# Patient Record
Sex: MALE | Race: WHITE | Employment: OTHER | URBAN - METROPOLITAN AREA
[De-identification: names, ages, dates, MRNs, and addresses within clinical notes are randomized per-mention and may not be internally consistent; named-entity substitution may affect disease eponyms.]

---

## 2017-10-10 PROBLEM — E78.5 HYPERLIPIDEMIA, UNSPECIFIED HYPERLIPIDEMIA TYPE: Status: ACTIVE | Noted: 2017-10-10

## 2020-08-20 PROBLEM — I49.9 IRREGULAR HEARTBEAT: Status: ACTIVE | Noted: 2020-08-20

## 2020-08-20 PROBLEM — E04.9 THYROID ENLARGEMENT: Status: ACTIVE | Noted: 2020-08-20

## 2021-01-25 PROBLEM — I49.3 PVC (PREMATURE VENTRICULAR CONTRACTION): Status: ACTIVE | Noted: 2021-01-25

## 2021-08-23 PROBLEM — E04.9 THYROID ENLARGEMENT: Status: RESOLVED | Noted: 2020-08-20 | Resolved: 2021-08-23

## 2021-08-23 PROBLEM — I49.3 FREQUENT PVCS: Status: ACTIVE | Noted: 2021-01-25

## 2021-08-23 PROBLEM — I49.9 IRREGULAR HEARTBEAT: Status: RESOLVED | Noted: 2020-08-20 | Resolved: 2021-08-23

## 2021-11-29 PROBLEM — I49.9 IRREGULAR CARDIAC RHYTHM: Status: ACTIVE | Noted: 2020-08-20

## 2024-01-09 RX ORDER — ASPIRIN 325 MG
325 TABLET ORAL DAILY
COMMUNITY

## 2024-01-09 RX ORDER — LAMOTRIGINE 25 MG/1
75 TABLET ORAL EVERY MORNING
COMMUNITY
Start: 2023-11-17

## 2024-01-09 NOTE — PAT NURSING NOTE
Patient called for upcoming EUS with Dr. Cueva on 1/18. Per pt, he was taking aspirin 325 mg per neurology, but stopped it for a surgery in December. Pt has not gotten back on it since and was planning to hold it for the EUS procedure. Pt informed that he should reach out to his neurologist to let them know he has been holding his aspirin and not gotten back on it. Patient notified if he is told to resume the aspirin, to call Dr. Cueva's office for instructions on when to hold the aspirin. Dr. Cueva's office number provided. Patient verbalized understanding.    Ambulatory

## 2024-01-18 ENCOUNTER — HOSPITAL ENCOUNTER (OUTPATIENT)
Facility: HOSPITAL | Age: 74
Setting detail: HOSPITAL OUTPATIENT SURGERY
Discharge: HOME | End: 2024-01-18
Attending: INTERNAL MEDICINE | Admitting: INTERNAL MEDICINE
Payer: COMMERCIAL

## 2024-01-18 ENCOUNTER — ANESTHESIA (OUTPATIENT)
Dept: ENDOSCOPY | Facility: HOSPITAL | Age: 74
End: 2024-01-18
Payer: COMMERCIAL

## 2024-01-18 ENCOUNTER — ANESTHESIA EVENT (OUTPATIENT)
Dept: ENDOSCOPY | Facility: HOSPITAL | Age: 74
End: 2024-01-18
Payer: COMMERCIAL

## 2024-01-18 PROCEDURE — BF4CZZZ ULTRASONOGRAPHY OF HEPATOBILIARY SYSTEM, ALL: ICD-10-PCS | Performed by: INTERNAL MEDICINE

## 2024-01-18 PROCEDURE — 0DJ08ZZ INSPECTION OF UPPER INTESTINAL TRACT, VIA NATURAL OR ARTIFICIAL OPENING ENDOSCOPIC: ICD-10-PCS | Performed by: INTERNAL MEDICINE

## 2024-01-18 RX ORDER — SODIUM CHLORIDE, SODIUM LACTATE, POTASSIUM CHLORIDE, CALCIUM CHLORIDE 600; 310; 30; 20 MG/100ML; MG/100ML; MG/100ML; MG/100ML
INJECTION, SOLUTION INTRAVENOUS CONTINUOUS
Status: DISCONTINUED | OUTPATIENT
Start: 2024-01-18 | End: 2024-01-18

## 2024-01-18 RX ORDER — LEVOFLOXACIN 5 MG/ML
500 INJECTION, SOLUTION INTRAVENOUS ONCE
Status: DISCONTINUED | OUTPATIENT
Start: 2024-01-18 | End: 2024-01-18

## 2024-01-18 RX ORDER — LIDOCAINE HYDROCHLORIDE 10 MG/ML
INJECTION, SOLUTION EPIDURAL; INFILTRATION; INTRACAUDAL; PERINEURAL AS NEEDED
Status: DISCONTINUED | OUTPATIENT
Start: 2024-01-18 | End: 2024-01-18 | Stop reason: SURG

## 2024-01-18 RX ORDER — NALOXONE HYDROCHLORIDE 0.4 MG/ML
0.08 INJECTION, SOLUTION INTRAMUSCULAR; INTRAVENOUS; SUBCUTANEOUS ONCE AS NEEDED
Status: DISCONTINUED | OUTPATIENT
Start: 2024-01-18 | End: 2024-01-18

## 2024-01-18 RX ADMIN — LIDOCAINE HYDROCHLORIDE 50 MG: 10 INJECTION, SOLUTION EPIDURAL; INFILTRATION; INTRACAUDAL; PERINEURAL at 09:04:00

## 2024-01-18 NOTE — ANESTHESIA POSTPROCEDURE EVALUATION
Patient: Musa Chavez    Procedure Summary       Date: 01/18/24 Room / Location: [facility] ENDOSCOPY 01 / [facility] ENDOSCOPY    Anesthesia Start: 0901 Anesthesia Stop: 0922    Procedure: ENDOSCOPIC ULTRASOUND (UPPER) WITH FINE NEEDLE ASPIRATION Diagnosis:       Pancreas cyst      (Intraductal papillary mucinous neoplasm)    Surgeons: Yaya Cueva MD Anesthesiologist: Benjy Antonio MD    Anesthesia Type: MAC ASA Status: 2            Anesthesia Type: MAC    Vitals Value Taken Time   /96 01/18/24 0920   Temp 98 01/18/24 0922   Pulse 57 01/18/24 0921   Resp 15 01/18/24 0921   SpO2 98 % 01/18/24 0921   Vitals shown include unfiled device data.    [facility] AN Post Evaluation    Benjy Antonio MD  1/18/2024 9:22 AM

## 2024-01-18 NOTE — ANESTHESIA PREPROCEDURE EVALUATION
Anesthesia PreOp Note    HPI:     Musa Chavez is a 74 year old male who presents for preoperative consultation requested by: Yaya Cueva MD    Date of Surgery: 1/18/2024    Procedure(s):  ENDOSCOPIC ULTRASOUND (UPPER) WITH FINE NEEDLE ASPIRATION  Indication: Pancreas cyst    Relevant Problems   No relevant active problems       NPO:  Last Liquid Consumption Date: 01/18/24  Last Liquid Consumption Time: 0515  Last Solid Consumption Date: 01/17/24  Last Solid Consumption Time: 1930  Last Liquid Consumption Date: 01/18/24          History Review:  Patient Active Problem List    Diagnosis Date Noted    Frequent PVCs 01/25/2021    Irregular cardiac rhythm 08/20/2020    Hyperlipidemia, unspecified hyperlipidemia type 10/10/2017    Essential hypertension 05/17/2011       Past Medical History:   Diagnosis Date    Arrhythmia     \"missed beats\" per pt    High blood pressure     High cholesterol     Hyperlipidemia     HYPERTENSION     OBESITY     Renal disorder     Mass on kidney; getting worked up after EUS with Dr. Cueva 01/18    Seizure disorder (HCC)     Possible seizures, pt worked up by neurology in July 2023       Past Surgical History:   Procedure Laterality Date    COLONOSCOPY  2004 and 2014    OTHER SURGICAL HISTORY      testicle    TONSILLECTOMY         Medications Prior to Admission   Medication Sig Dispense Refill Last Dose    lamoTRIgine 25 MG Oral Tab Take 3 tablets (75 mg total) by mouth every morning.   1/18/2024 at 0515    aspirin 325 MG Oral Tab Take 1 tablet (325 mg total) by mouth daily.   12/25/2023    metoprolol tartrate 25 MG Oral Tab Take 1 tablet (25 mg total) by mouth 2 (two) times daily. 180 tablet 0 1/18/2024 at 0515    atorvastatin 10 MG Oral Tab Take 1 tablet (10 mg total) by mouth daily. 90 tablet 0 1/17/2024    losartan 100 MG Oral Tab Take 1 tablet (100 mg total) by mouth daily. 90 tablet 1 1/18/2024     Current Facility-Administered Medications Ordered in Epic   Medication Dose  Route Frequency Provider Last Rate Last Admin    lactated ringers infusion   Intravenous Continuous Yaya Cueva MD        levoFLOXacin in dextrose 5% (Levaquin) 500 mg/100mL IVPB premix 500 mg  500 mg Intravenous Once Yaya Cueva MD         No current [facility]-ordered outpatient medications on file.       No Known Allergies    Family History   Problem Relation Age of Onset    Cancer Father         bladder    Hypertension Mother     Lipids Mother      Social History     Socioeconomic History    Marital status:    Tobacco Use    Smoking status: Never    Smokeless tobacco: Never   Substance and Sexual Activity    Alcohol use: Yes     Comment: socially, once a week    Drug use: No       Available pre-op labs reviewed.             Vital Signs:  Body mass index is 32.98 kg/m².   height is 1.854 m (6' 1\") and weight is 113.4 kg (250 lb). His blood pressure is 167/100 (abnormal) and his pulse is 64. His respiration is 19 and oxygen saturation is 99%.   Vitals:    01/09/24 1044 01/18/24 0841   BP:  (!) 167/100   Pulse:  64   Resp:  19   SpO2:  99%   Weight: 113.4 kg (250 lb)    Height: 1.854 m (6' 1\")         Anesthesia Evaluation     Patient summary reviewed    Airway   Mallampati: III  TM distance: >3 FB  Neck ROM: full  Dental      Pulmonary - negative ROS and normal exam   Cardiovascular - normal exam  (+) hypertension    Neuro/Psych    (+)  seizures,        GI/Hepatic/Renal - negative ROS     Endo/Other - negative ROS   Abdominal  - normal exam                 Anesthesia Plan:   ASA:  2  Plan:   MAC  Plan Comments: Anesthesia plan was discussed with the patient, going through the rationale, expectations, benefits and risks namely injury to lips, teeth, gyms or corneas, risks of an allergic reaction, risk of awareness or recall of intra-operative events, risk of prolonged intubation and ICU admission, risks of kidney failure, risk of coronary events or dysrhythmias, risk of cerebrovascular events or  stroke and on rare occasions death.         I have informed Musa Chavez and/or legal guardian or family member of the nature of the anesthetic plan, benefits, risks including possible dental damage if relevant, major complications, and any alternative forms of anesthetic management.   All of the patient's questions were answered to the best of my ability. The patient desires the anesthetic management as planned.  Benjy Antonio MD  1/18/2024 8:56 AM  Present on Admission:  **None**

## 2024-01-18 NOTE — OPERATIVE REPORT
[facility] OPERATIVE REPORT   PATIENT NAME: Musa Chavez  MRN: [id number]  DATE OF OPERATION: 1/18/2024  PREOPERATIVE DIAGNOSIS: pancreatic cysts  POSTOPERATIVE DIAGNOSIS:    1.  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 The patient was identified by myself and nursing staff in the exam room. Informed consent was obtained. The patient was seen in clinic and a full H&P was obtained. On brief physical examination, airway is patent. Chest is clear. Heart has regular rate and rhythm. Abdomen is soft, nontender with good bowel sounds. A medication list was taken by nursing today and reviewed by myself. The patient is an ASA grade 2.   PROCEDURE NOTE: The procedure was completed without difficulty. The patient tolerated the procedure well.   Upper endoscopy (EGD):  The endoscope was inserted through the mouth and advanced to the level of the duodenum, 3rd portion.  Visualized portion of the esophagus, stomach including antrum, body, fundus and cardiac, and duodenum were normal.  Endoscopic ultrasound (EUS):  Endoscopic ultrasound was performed using the linear echoendoscope.  Images were obtained.    LIVER: Left lobe of the liver was visualized and no mass or lesions seen.  No intrahepatic duct dilation was noted.  Portal vein was noted to come out of the liver and the portal confluence was seen and pancreas was noted.   PANCREAS:  Pancreatic neck, body, and tail were interrogated from the gastric body while the neck, head and uncinate were examined from the  1st and 2nd duodenum.  PD-normal throughout  - neck: 1.1 mm  - head: 3.9 mm  Pancreas divisum: no  Chronic pancreatitis changes: no  Neoplasm: no   Cysts:  yes.  Multiple cystic lesions were seen as noted above.  The largest 1 seem to communicate with the main pancreatic duct with a small sidebranch.  No worrisome features are noted.  A smaller cystic lesion in the uncinate process contained a central mucus ball as suggested by previous features with a well-circumscribed round lesion with hyperechoic rim and central anechoic area.  Biliary Tree:  - common hepatic duct: 4.2 mm  - stones: no  GALLBLADDER: not visualized   CELIAC AXIS:  visualized without lymphadenopathy  L ADRENAL GLAND:  visualized   L KIDNEY:  visualized   MEDIASTINUM:  visualized without lymphadenopathy  Scope was withdrawn from the patient and patient tolerated the procedure well.  FINDINGS   Multiple pancreatic cystic lesions consistent with branch duct IPMN without any worrisome features  RECOMMENDATIONS: Repeat MRI in 6 months to assess stability  DISCHARGE:  On discharge, the patient was given an after-visit summary detailing the procedure, findings, followup plans, and an updated medication list.     Thank you very much for the consultation.  I really appreciate it.    Yaya Cueva MD

## 2024-01-18 NOTE — DISCHARGE INSTRUCTIONS
Home Care Instructions for Gastroscopy with Sedation    Diet:  - Resume your regular diet as tolerated unless otherwise instructed.  - Start with light meals to minimize bloating.  - Do not drink alcohol today.    Medication:  - If you have questions about resuming your normal medications, please contact your Primary Care Physician.    Activities:  - Take it easy today. Do not return to work today.  - Do not drive today.  - Do not operate any machinery today (including kitchen equipment).    Gastroscopy:  - You may have a sore throat for 2-3 days following the exam. This is normal. Gargling with warm salt water (1/2 tsp salt to 1 glass warm water) or using throat lozenges will help.  - If you experience any sharp pain in your neck, abdomen or chest, vomiting of blood, oral temperature over 100 degrees Fahrenheit, light-headedness or dizziness, or any other problems, contact your doctor.    **If unable to reach your doctor, please go to the [facility] Emergency Room**    - Your referring physician will receive a full report of your examination.  - If you do not hear from your doctor's office within two weeks of your biopsy, please call them for your results.    You may be able to see your laboratory results in [employer] between 4 and 7 business days.  In some cases, your physician may not have viewed the results before they are released to [employer].  If you have questions regarding your results contact the physician who ordered the test/exam by phone or via [employer] by choosing \"Ask a Medical Question.\"

## 2024-01-18 NOTE — H&P
History & Physical Examination    Patient Name: Musa Chavez  MRN: [id number]  CSN: [id number]  YOB: 1950    Diagnosis: Pancreas cyst      Present Illness:  Musa Chavez is a 74 year old male is here Pancreas cyst.    Body mass index is 32.98 kg/m².    Past Medical History:   Diagnosis Date    Arrhythmia     \"missed beats\" per pt    High blood pressure     High cholesterol     Hyperlipidemia     HYPERTENSION     OBESITY     Renal disorder     Mass on kidney; getting worked up after EUS with Dr. Cueva 01/18    Seizure disorder (HCC)     Possible seizures, pt worked up by neurology in July 2023       Procedure: EUS    Physician Pre-Sedation Assessment    Pre-Sedation Assessment:    Sedation History: Airway Assessed    ASA Classification: 2. Patient with mild systemic disease    Cardiac:    Respiratory:    Abdomen:      Plan: MAC        Current Facility-Administered Medications   Medication Dose Route Frequency    lactated ringers infusion   Intravenous Continuous    levoFLOXacin in dextrose 5% (Levaquin) 500 mg/100mL IVPB premix 500 mg  500 mg Intravenous Once       Allergies: No Known Allergies    Past Surgical History:   Procedure Laterality Date    COLONOSCOPY  2004 and 2014    OTHER SURGICAL HISTORY      testicle    TONSILLECTOMY       Family History   Problem Relation Age of Onset    Cancer Father         bladder    Hypertension Mother     Lipids Mother      Social History     Tobacco Use    Smoking status: Never    Smokeless tobacco: Never   Substance Use Topics    Alcohol use: Yes     Comment: socially, once a week       SYSTEM Check if Review is Normal Check if Physical Exam is Normal If not normal, please explain:   HEENT [x ] [x ]    NECK & BACK [x ] [x ]    HEART [x ] [x ]    LUNGS [x ] [x ]    ABDOMEN [x ] [x ]    UROGENITAL [ ] [ ]    EXTREMITIES [ ] [ ]    OTHER        [ x ] I have discussed the risks and benefits and alternatives with the patient/family.  They understand and agree to  proceed with plan of care.  [ x ] I have reviewed the History and Physical done within the last 30 days.  Any changes noted above.    Yaya Cueva MD  1/18/2024  8:55 AM

## 2024-01-20 VITALS
RESPIRATION RATE: 25 BRPM | HEART RATE: 55 BPM | BODY MASS INDEX: 33.13 KG/M2 | SYSTOLIC BLOOD PRESSURE: 133 MMHG | WEIGHT: 250 LBS | DIASTOLIC BLOOD PRESSURE: 90 MMHG | HEIGHT: 73 IN | OXYGEN SATURATION: 98 %

## 2024-01-20 NOTE — ANESTHESIA POSTPROCEDURE EVALUATION
Patient: Musa Chavez    Procedure Summary       Date: 01/18/24 Room / Location: [facility] ENDOSCOPY 01 / [facility] ENDOSCOPY    Anesthesia Start: 0901 Anesthesia Stop: 0922    Procedure: ENDOSCOPIC ULTRASOUND (UPPER) Diagnosis:       Pancreas cyst      (Intraductal papillary mucinous neoplasm)    Surgeons: Yaya Cueva MD Anesthesiologist: Benjy Antonio MD    Anesthesia Type: MAC ASA Status: 2            Anesthesia Type: MAC    Vitals Value Taken Time   /94 01/18/24 0945   Temp 98.6 01/20/24 0901   Pulse 56 01/18/24 0946   Resp 16 01/18/24 0946   SpO2 97 % 01/18/24 0946   Vitals shown include unfiled device data.    [facility] AN Post Evaluation:   Patient Evaluated in PACU  Patient Participation: complete - patient participated  Level of Consciousness: awake  Pain Score: 2  Pain Management: adequate  Airway Patency:patent  Dental exam unchanged from preop  Yes    Cardiovascular Status: hemodynamically stable  Respiratory Status: spontaneous ventilation  Postoperative Hydration euvolemic      Benjy Antonio MD  1/20/2024 9:01 AM

## 2024-02-21 RX ORDER — AMLODIPINE BESYLATE 5 MG/1
5 TABLET ORAL DAILY
COMMUNITY

## 2024-02-21 RX ORDER — SODIUM CHLORIDE, SODIUM LACTATE, POTASSIUM CHLORIDE, CALCIUM CHLORIDE 600; 310; 30; 20 MG/100ML; MG/100ML; MG/100ML; MG/100ML
INJECTION, SOLUTION INTRAVENOUS CONTINUOUS
Status: CANCELLED | OUTPATIENT
Start: 2024-02-21

## 2024-02-27 ENCOUNTER — LABORATORY ENCOUNTER (OUTPATIENT)
Dept: LAB | Facility: HOSPITAL | Age: 74
End: 2024-02-27
Attending: UROLOGY
Payer: MEDICARE

## 2024-02-27 DIAGNOSIS — Z01.818 PRE-OP TESTING: ICD-10-CM

## 2024-02-27 LAB
ANTIBODY SCREEN: NEGATIVE
RH BLOOD TYPE: POSITIVE

## 2024-02-27 PROCEDURE — 86900 BLOOD TYPING SEROLOGIC ABO: CPT

## 2024-02-27 PROCEDURE — 86901 BLOOD TYPING SEROLOGIC RH(D): CPT

## 2024-02-27 PROCEDURE — 86850 RBC ANTIBODY SCREEN: CPT

## 2024-03-13 ENCOUNTER — ANESTHESIA EVENT (OUTPATIENT)
Dept: SURGERY | Facility: HOSPITAL | Age: 74
End: 2024-03-13
Payer: MEDICARE

## 2024-03-14 ENCOUNTER — ANESTHESIA (OUTPATIENT)
Dept: SURGERY | Facility: HOSPITAL | Age: 74
End: 2024-03-14
Payer: MEDICARE

## 2024-03-14 ENCOUNTER — HOSPITAL ENCOUNTER (INPATIENT)
Facility: HOSPITAL | Age: 74
LOS: 2 days | Discharge: HOME | End: 2024-03-16
Attending: UROLOGY | Admitting: UROLOGY
Payer: MEDICARE

## 2024-03-14 DIAGNOSIS — N28.89 RIGHT RENAL MASS: ICD-10-CM

## 2024-03-14 DIAGNOSIS — Z01.818 PRE-OP TESTING: Primary | ICD-10-CM

## 2024-03-14 LAB
ANION GAP SERPL CALC-SCNC: 5 MMOL/L (ref 0–18)
BUN BLD-MCNC: 17 MG/DL (ref 9–23)
CALCIUM BLD-MCNC: 7.9 MG/DL (ref 8.5–10.1)
CHLORIDE SERPL-SCNC: 112 MMOL/L (ref 98–112)
CO2 SERPL-SCNC: 21 MMOL/L (ref 21–32)
CREAT BLD-MCNC: 1.06 MG/DL
EGFRCR SERPLBLD CKD-EPI 2021: 75 ML/MIN/1.73M2 (ref 60–?)
ERYTHROCYTE [DISTWIDTH] IN BLOOD BY AUTOMATED COUNT: 11.5 %
GLUCOSE BLD-MCNC: 152 MG/DL (ref 70–99)
HCT VFR BLD AUTO: 40.8 %
HGB BLD-MCNC: 15.2 G/DL
MCH RBC QN AUTO: 33.6 PG (ref 26–34)
MCHC RBC AUTO-ENTMCNC: 37.3 G/DL (ref 31–37)
MCV RBC AUTO: 90.1 FL
OSMOLALITY SERPL CALC.SUM OF ELEC: 291 MOSM/KG (ref 275–295)
PLATELET # BLD AUTO: 194 10(3)UL (ref 150–450)
POTASSIUM SERPL-SCNC: 3.5 MMOL/L (ref 3.5–5.1)
RBC # BLD AUTO: 4.53 X10(6)UL
SODIUM SERPL-SCNC: 138 MMOL/L (ref 136–145)
WBC # BLD AUTO: 11 X10(3) UL (ref 4–11)

## 2024-03-14 PROCEDURE — 3E0T3BZ INTRODUCTION OF ANESTHETIC AGENT INTO PERIPHERAL NERVES AND PLEXI, PERCUTANEOUS APPROACH: ICD-10-PCS | Performed by: UROLOGY

## 2024-03-14 PROCEDURE — 80048 BASIC METABOLIC PNL TOTAL CA: CPT | Performed by: UROLOGY

## 2024-03-14 PROCEDURE — 88307 TISSUE EXAM BY PATHOLOGIST: CPT | Performed by: UROLOGY

## 2024-03-14 PROCEDURE — 76942 ECHO GUIDE FOR BIOPSY: CPT | Performed by: STUDENT IN AN ORGANIZED HEALTH CARE EDUCATION/TRAINING PROGRAM

## 2024-03-14 PROCEDURE — 88341 IMHCHEM/IMCYTCHM EA ADD ANTB: CPT | Performed by: UROLOGY

## 2024-03-14 PROCEDURE — 85027 COMPLETE CBC AUTOMATED: CPT | Performed by: UROLOGY

## 2024-03-14 PROCEDURE — 0TT04ZZ RESECTION OF RIGHT KIDNEY, PERCUTANEOUS ENDOSCOPIC APPROACH: ICD-10-PCS | Performed by: UROLOGY

## 2024-03-14 PROCEDURE — 8E0W4CZ ROBOTIC ASSISTED PROCEDURE OF TRUNK REGION, PERCUTANEOUS ENDOSCOPIC APPROACH: ICD-10-PCS | Performed by: UROLOGY

## 2024-03-14 PROCEDURE — 88342 IMHCHEM/IMCYTCHM 1ST ANTB: CPT | Performed by: UROLOGY

## 2024-03-14 RX ORDER — CEFAZOLIN SODIUM/WATER 2 G/20 ML
2 SYRINGE (ML) INTRAVENOUS ONCE
Status: COMPLETED | OUTPATIENT
Start: 2024-03-14 | End: 2024-03-14

## 2024-03-14 RX ORDER — ESMOLOL HYDROCHLORIDE 10 MG/ML
INJECTION INTRAVENOUS AS NEEDED
Status: DISCONTINUED | OUTPATIENT
Start: 2024-03-14 | End: 2024-03-14 | Stop reason: SURG

## 2024-03-14 RX ORDER — AMLODIPINE BESYLATE 5 MG/1
5 TABLET ORAL DAILY
Status: DISCONTINUED | OUTPATIENT
Start: 2024-03-14 | End: 2024-03-16

## 2024-03-14 RX ORDER — ROCURONIUM BROMIDE 10 MG/ML
INJECTION, SOLUTION INTRAVENOUS AS NEEDED
Status: DISCONTINUED | OUTPATIENT
Start: 2024-03-14 | End: 2024-03-14 | Stop reason: SURG

## 2024-03-14 RX ORDER — OXYCODONE HYDROCHLORIDE 5 MG/1
2.5 TABLET ORAL EVERY 4 HOURS PRN
Status: DISCONTINUED | OUTPATIENT
Start: 2024-03-14 | End: 2024-03-15

## 2024-03-14 RX ORDER — METOCLOPRAMIDE HYDROCHLORIDE 5 MG/ML
10 INJECTION INTRAMUSCULAR; INTRAVENOUS EVERY 8 HOURS PRN
Status: DISCONTINUED | OUTPATIENT
Start: 2024-03-14 | End: 2024-03-16

## 2024-03-14 RX ORDER — OXYCODONE HYDROCHLORIDE 5 MG/1
5 TABLET ORAL EVERY 4 HOURS PRN
Status: DISCONTINUED | OUTPATIENT
Start: 2024-03-14 | End: 2024-03-15

## 2024-03-14 RX ORDER — HYDROMORPHONE HYDROCHLORIDE 1 MG/ML
0.4 INJECTION, SOLUTION INTRAMUSCULAR; INTRAVENOUS; SUBCUTANEOUS EVERY 2 HOUR PRN
Status: DISCONTINUED | OUTPATIENT
Start: 2024-03-14 | End: 2024-03-16

## 2024-03-14 RX ORDER — SENNOSIDES 8.6 MG
17.2 TABLET ORAL NIGHTLY
Status: DISCONTINUED | OUTPATIENT
Start: 2024-03-14 | End: 2024-03-16

## 2024-03-14 RX ORDER — HYDROMORPHONE HYDROCHLORIDE 1 MG/ML
INJECTION, SOLUTION INTRAMUSCULAR; INTRAVENOUS; SUBCUTANEOUS
Status: COMPLETED
Start: 2024-03-14 | End: 2024-03-14

## 2024-03-14 RX ORDER — HYDROMORPHONE HYDROCHLORIDE 1 MG/ML
0.6 INJECTION, SOLUTION INTRAMUSCULAR; INTRAVENOUS; SUBCUTANEOUS EVERY 5 MIN PRN
Status: DISCONTINUED | OUTPATIENT
Start: 2024-03-14 | End: 2024-03-14 | Stop reason: HOSPADM

## 2024-03-14 RX ORDER — MAGNESIUM SULFATE HEPTAHYDRATE 500 MG/ML
INJECTION, SOLUTION INTRAMUSCULAR; INTRAVENOUS AS NEEDED
Status: DISCONTINUED | OUTPATIENT
Start: 2024-03-14 | End: 2024-03-14 | Stop reason: SURG

## 2024-03-14 RX ORDER — HYDROCODONE BITARTRATE AND ACETAMINOPHEN 5; 325 MG/1; MG/1
1 TABLET ORAL ONCE AS NEEDED
Status: DISCONTINUED | OUTPATIENT
Start: 2024-03-14 | End: 2024-03-14 | Stop reason: HOSPADM

## 2024-03-14 RX ORDER — DEXTROSE, SODIUM CHLORIDE, SODIUM LACTATE, POTASSIUM CHLORIDE, AND CALCIUM CHLORIDE 5; .6; .31; .03; .02 G/100ML; G/100ML; G/100ML; G/100ML; G/100ML
INJECTION, SOLUTION INTRAVENOUS CONTINUOUS
Status: DISCONTINUED | OUTPATIENT
Start: 2024-03-14 | End: 2024-03-16

## 2024-03-14 RX ORDER — LOSARTAN POTASSIUM 100 MG/1
100 TABLET ORAL DAILY
Status: DISCONTINUED | OUTPATIENT
Start: 2024-03-14 | End: 2024-03-16

## 2024-03-14 RX ORDER — ACETAMINOPHEN 500 MG
1000 TABLET ORAL ONCE AS NEEDED
Status: DISCONTINUED | OUTPATIENT
Start: 2024-03-14 | End: 2024-03-14 | Stop reason: HOSPADM

## 2024-03-14 RX ORDER — SODIUM CHLORIDE 9 MG/ML
INJECTION, SOLUTION INTRAVENOUS CONTINUOUS PRN
Status: DISCONTINUED | OUTPATIENT
Start: 2024-03-14 | End: 2024-03-14 | Stop reason: SURG

## 2024-03-14 RX ORDER — ATORVASTATIN CALCIUM 10 MG/1
10 TABLET, FILM COATED ORAL DAILY
Status: DISCONTINUED | OUTPATIENT
Start: 2024-03-15 | End: 2024-03-16

## 2024-03-14 RX ORDER — LIDOCAINE HYDROCHLORIDE ANHYDROUS AND DEXTROSE MONOHYDRATE .8; 5 G/100ML; G/100ML
INJECTION, SOLUTION INTRAVENOUS CONTINUOUS PRN
Status: DISCONTINUED | OUTPATIENT
Start: 2024-03-14 | End: 2024-03-14 | Stop reason: SURG

## 2024-03-14 RX ORDER — ACETAMINOPHEN 500 MG
1000 TABLET ORAL ONCE
Status: DISCONTINUED | OUTPATIENT
Start: 2024-03-14 | End: 2024-03-14 | Stop reason: HOSPADM

## 2024-03-14 RX ORDER — EPHEDRINE SULFATE 50 MG/ML
INJECTION INTRAVENOUS AS NEEDED
Status: DISCONTINUED | OUTPATIENT
Start: 2024-03-14 | End: 2024-03-14 | Stop reason: SURG

## 2024-03-14 RX ORDER — METOCLOPRAMIDE HYDROCHLORIDE 5 MG/ML
10 INJECTION INTRAMUSCULAR; INTRAVENOUS EVERY 8 HOURS PRN
Status: DISCONTINUED | OUTPATIENT
Start: 2024-03-14 | End: 2024-03-14 | Stop reason: HOSPADM

## 2024-03-14 RX ORDER — INSULIN ASPART 100 [IU]/ML
INJECTION, SOLUTION INTRAVENOUS; SUBCUTANEOUS ONCE
Status: DISCONTINUED | OUTPATIENT
Start: 2024-03-14 | End: 2024-03-14 | Stop reason: HOSPADM

## 2024-03-14 RX ORDER — NEOSTIGMINE METHYLSULFATE 1 MG/ML
INJECTION, SOLUTION INTRAVENOUS AS NEEDED
Status: DISCONTINUED | OUTPATIENT
Start: 2024-03-14 | End: 2024-03-14 | Stop reason: SURG

## 2024-03-14 RX ORDER — SODIUM CHLORIDE, SODIUM LACTATE, POTASSIUM CHLORIDE, CALCIUM CHLORIDE 600; 310; 30; 20 MG/100ML; MG/100ML; MG/100ML; MG/100ML
INJECTION, SOLUTION INTRAVENOUS CONTINUOUS
Status: DISCONTINUED | OUTPATIENT
Start: 2024-03-14 | End: 2024-03-14 | Stop reason: HOSPADM

## 2024-03-14 RX ORDER — GLYCOPYRROLATE 0.2 MG/ML
INJECTION, SOLUTION INTRAMUSCULAR; INTRAVENOUS AS NEEDED
Status: DISCONTINUED | OUTPATIENT
Start: 2024-03-14 | End: 2024-03-14 | Stop reason: SURG

## 2024-03-14 RX ORDER — POLYETHYLENE GLYCOL 3350 17 G/17G
17 POWDER, FOR SOLUTION ORAL DAILY PRN
Status: DISCONTINUED | OUTPATIENT
Start: 2024-03-14 | End: 2024-03-16

## 2024-03-14 RX ORDER — MELATONIN
3 NIGHTLY PRN
Status: DISCONTINUED | OUTPATIENT
Start: 2024-03-14 | End: 2024-03-16

## 2024-03-14 RX ORDER — BUPIVACAINE HYDROCHLORIDE 2.5 MG/ML
INJECTION, SOLUTION EPIDURAL; INFILTRATION; INTRACAUDAL AS NEEDED
Status: DISCONTINUED | OUTPATIENT
Start: 2024-03-14 | End: 2024-03-14 | Stop reason: HOSPADM

## 2024-03-14 RX ORDER — LABETALOL HYDROCHLORIDE 5 MG/ML
5 INJECTION, SOLUTION INTRAVENOUS EVERY 5 MIN PRN
Status: DISCONTINUED | OUTPATIENT
Start: 2024-03-14 | End: 2024-03-14 | Stop reason: HOSPADM

## 2024-03-14 RX ORDER — KETAMINE HYDROCHLORIDE 50 MG/ML
INJECTION, SOLUTION INTRAMUSCULAR; INTRAVENOUS AS NEEDED
Status: DISCONTINUED | OUTPATIENT
Start: 2024-03-14 | End: 2024-03-14 | Stop reason: SURG

## 2024-03-14 RX ORDER — HYDROMORPHONE HYDROCHLORIDE 1 MG/ML
0.2 INJECTION, SOLUTION INTRAMUSCULAR; INTRAVENOUS; SUBCUTANEOUS EVERY 2 HOUR PRN
Status: DISCONTINUED | OUTPATIENT
Start: 2024-03-14 | End: 2024-03-16

## 2024-03-14 RX ORDER — ATORVASTATIN CALCIUM 10 MG/1
10 TABLET, FILM COATED ORAL NIGHTLY
COMMUNITY

## 2024-03-14 RX ORDER — ONDANSETRON 2 MG/ML
4 INJECTION INTRAMUSCULAR; INTRAVENOUS EVERY 6 HOURS PRN
Status: DISCONTINUED | OUTPATIENT
Start: 2024-03-14 | End: 2024-03-14 | Stop reason: HOSPADM

## 2024-03-14 RX ORDER — SODIUM CHLORIDE, SODIUM LACTATE, POTASSIUM CHLORIDE, CALCIUM CHLORIDE 600; 310; 30; 20 MG/100ML; MG/100ML; MG/100ML; MG/100ML
INJECTION, SOLUTION INTRAVENOUS CONTINUOUS
Status: DISCONTINUED | OUTPATIENT
Start: 2024-03-14 | End: 2024-03-14

## 2024-03-14 RX ORDER — HYDROMORPHONE HYDROCHLORIDE 1 MG/ML
0.2 INJECTION, SOLUTION INTRAMUSCULAR; INTRAVENOUS; SUBCUTANEOUS EVERY 5 MIN PRN
Status: DISCONTINUED | OUTPATIENT
Start: 2024-03-14 | End: 2024-03-14 | Stop reason: HOSPADM

## 2024-03-14 RX ORDER — BUPIVACAINE HYDROCHLORIDE 5 MG/ML
INJECTION, SOLUTION EPIDURAL; INTRACAUDAL AS NEEDED
Status: DISCONTINUED | OUTPATIENT
Start: 2024-03-14 | End: 2024-03-14 | Stop reason: SURG

## 2024-03-14 RX ORDER — DOCUSATE SODIUM 100 MG/1
100 CAPSULE, LIQUID FILLED ORAL 2 TIMES DAILY
Status: DISCONTINUED | OUTPATIENT
Start: 2024-03-14 | End: 2024-03-16

## 2024-03-14 RX ORDER — HYDROCODONE BITARTRATE AND ACETAMINOPHEN 5; 325 MG/1; MG/1
2 TABLET ORAL ONCE AS NEEDED
Status: DISCONTINUED | OUTPATIENT
Start: 2024-03-14 | End: 2024-03-14 | Stop reason: HOSPADM

## 2024-03-14 RX ORDER — LAMOTRIGINE 25 MG/1
75 TABLET ORAL EVERY MORNING
Status: DISCONTINUED | OUTPATIENT
Start: 2024-03-15 | End: 2024-03-16

## 2024-03-14 RX ORDER — HEPARIN SODIUM 5000 [USP'U]/ML
5000 INJECTION, SOLUTION INTRAVENOUS; SUBCUTANEOUS EVERY 8 HOURS SCHEDULED
Status: DISCONTINUED | OUTPATIENT
Start: 2024-03-14 | End: 2024-03-16

## 2024-03-14 RX ORDER — DIPHENHYDRAMINE HYDROCHLORIDE 50 MG/ML
INJECTION INTRAMUSCULAR; INTRAVENOUS AS NEEDED
Status: DISCONTINUED | OUTPATIENT
Start: 2024-03-14 | End: 2024-03-14 | Stop reason: SURG

## 2024-03-14 RX ORDER — NALOXONE HYDROCHLORIDE 0.4 MG/ML
0.08 INJECTION, SOLUTION INTRAMUSCULAR; INTRAVENOUS; SUBCUTANEOUS AS NEEDED
Status: DISCONTINUED | OUTPATIENT
Start: 2024-03-14 | End: 2024-03-14 | Stop reason: HOSPADM

## 2024-03-14 RX ORDER — MEPERIDINE HYDROCHLORIDE 25 MG/ML
12.5 INJECTION INTRAMUSCULAR; INTRAVENOUS; SUBCUTANEOUS AS NEEDED
Status: DISCONTINUED | OUTPATIENT
Start: 2024-03-14 | End: 2024-03-14 | Stop reason: HOSPADM

## 2024-03-14 RX ORDER — HYDROMORPHONE HYDROCHLORIDE 1 MG/ML
0.4 INJECTION, SOLUTION INTRAMUSCULAR; INTRAVENOUS; SUBCUTANEOUS EVERY 5 MIN PRN
Status: DISCONTINUED | OUTPATIENT
Start: 2024-03-14 | End: 2024-03-14 | Stop reason: HOSPADM

## 2024-03-14 RX ORDER — HEPARIN SODIUM 5000 [USP'U]/ML
5000 INJECTION, SOLUTION INTRAVENOUS; SUBCUTANEOUS ONCE
Status: COMPLETED | OUTPATIENT
Start: 2024-03-14 | End: 2024-03-14

## 2024-03-14 RX ORDER — ONDANSETRON 2 MG/ML
4 INJECTION INTRAMUSCULAR; INTRAVENOUS EVERY 6 HOURS PRN
Status: DISCONTINUED | OUTPATIENT
Start: 2024-03-14 | End: 2024-03-16

## 2024-03-14 RX ORDER — LIDOCAINE HYDROCHLORIDE 10 MG/ML
INJECTION, SOLUTION EPIDURAL; INFILTRATION; INTRACAUDAL; PERINEURAL AS NEEDED
Status: DISCONTINUED | OUTPATIENT
Start: 2024-03-14 | End: 2024-03-14 | Stop reason: SURG

## 2024-03-14 RX ORDER — ONDANSETRON 2 MG/ML
INJECTION INTRAMUSCULAR; INTRAVENOUS AS NEEDED
Status: DISCONTINUED | OUTPATIENT
Start: 2024-03-14 | End: 2024-03-14 | Stop reason: SURG

## 2024-03-14 RX ORDER — DEXAMETHASONE SODIUM PHOSPHATE 4 MG/ML
VIAL (ML) INJECTION AS NEEDED
Status: DISCONTINUED | OUTPATIENT
Start: 2024-03-14 | End: 2024-03-14 | Stop reason: SURG

## 2024-03-14 RX ADMIN — DIPHENHYDRAMINE HYDROCHLORIDE 12.5 MG: 50 INJECTION INTRAMUSCULAR; INTRAVENOUS at 08:45:00

## 2024-03-14 RX ADMIN — SODIUM CHLORIDE: 9 INJECTION, SOLUTION INTRAVENOUS at 08:28:00

## 2024-03-14 RX ADMIN — CEFAZOLIN SODIUM/WATER 2 G: 2 G/20 ML SYRINGE (ML) INTRAVENOUS at 08:41:00

## 2024-03-14 RX ADMIN — ROCURONIUM BROMIDE 100 MG: 10 INJECTION, SOLUTION INTRAVENOUS at 08:13:00

## 2024-03-14 RX ADMIN — SODIUM CHLORIDE, SODIUM LACTATE, POTASSIUM CHLORIDE, CALCIUM CHLORIDE: 600; 310; 30; 20 INJECTION, SOLUTION INTRAVENOUS at 08:09:00

## 2024-03-14 RX ADMIN — KETAMINE HYDROCHLORIDE 50 MG: 50 INJECTION, SOLUTION INTRAMUSCULAR; INTRAVENOUS at 08:44:00

## 2024-03-14 RX ADMIN — LIDOCAINE HYDROCHLORIDE ANHYDROUS AND DEXTROSE MONOHYDRATE 1 MG/KG/HR: .8; 5 INJECTION, SOLUTION INTRAVENOUS at 09:30:00

## 2024-03-14 RX ADMIN — SODIUM CHLORIDE: 9 INJECTION, SOLUTION INTRAVENOUS at 09:14:00

## 2024-03-14 RX ADMIN — BUPIVACAINE HYDROCHLORIDE 30 ML: 5 INJECTION, SOLUTION EPIDURAL; INTRACAUDAL at 08:21:00

## 2024-03-14 RX ADMIN — DEXAMETHASONE SODIUM PHOSPHATE 4 MG: 4 MG/ML VIAL (ML) INJECTION at 08:45:00

## 2024-03-14 RX ADMIN — EPHEDRINE SULFATE 10 MG: 50 INJECTION INTRAVENOUS at 10:46:00

## 2024-03-14 RX ADMIN — EPHEDRINE SULFATE 10 MG: 50 INJECTION INTRAVENOUS at 09:17:00

## 2024-03-14 RX ADMIN — ESMOLOL HYDROCHLORIDE 80 MG: 10 INJECTION INTRAVENOUS at 08:11:00

## 2024-03-14 RX ADMIN — SODIUM CHLORIDE, SODIUM LACTATE, POTASSIUM CHLORIDE, CALCIUM CHLORIDE: 600; 310; 30; 20 INJECTION, SOLUTION INTRAVENOUS at 11:50:00

## 2024-03-14 RX ADMIN — EPHEDRINE SULFATE 10 MG: 50 INJECTION INTRAVENOUS at 08:52:00

## 2024-03-14 RX ADMIN — EPHEDRINE SULFATE 10 MG: 50 INJECTION INTRAVENOUS at 09:36:00

## 2024-03-14 RX ADMIN — ROCURONIUM BROMIDE 25 MG: 10 INJECTION, SOLUTION INTRAVENOUS at 10:19:00

## 2024-03-14 RX ADMIN — GLYCOPYRROLATE 1 MG: 0.2 INJECTION, SOLUTION INTRAMUSCULAR; INTRAVENOUS at 11:55:00

## 2024-03-14 RX ADMIN — LIDOCAINE HYDROCHLORIDE 20 MG: 10 INJECTION, SOLUTION EPIDURAL; INFILTRATION; INTRACAUDAL; PERINEURAL at 08:13:00

## 2024-03-14 RX ADMIN — ROCURONIUM BROMIDE 25 MG: 10 INJECTION, SOLUTION INTRAVENOUS at 10:56:00

## 2024-03-14 RX ADMIN — SODIUM CHLORIDE: 9 INJECTION, SOLUTION INTRAVENOUS at 10:07:00

## 2024-03-14 RX ADMIN — SODIUM CHLORIDE: 9 INJECTION, SOLUTION INTRAVENOUS at 12:15:00

## 2024-03-14 RX ADMIN — NEOSTIGMINE METHYLSULFATE 5 MG: 1 INJECTION, SOLUTION INTRAVENOUS at 11:55:00

## 2024-03-14 RX ADMIN — ROCURONIUM BROMIDE 25 MG: 10 INJECTION, SOLUTION INTRAVENOUS at 09:32:00

## 2024-03-14 RX ADMIN — MAGNESIUM SULFATE HEPTAHYDRATE 1 G: 500 INJECTION, SOLUTION INTRAMUSCULAR; INTRAVENOUS at 08:43:00

## 2024-03-14 RX ADMIN — ONDANSETRON 4 MG: 2 INJECTION INTRAMUSCULAR; INTRAVENOUS at 11:49:00

## 2024-03-14 NOTE — CONSULTS
General Medicine Consult      Reason for consult: medical management    Consulted by: Dr. Anton    PCP: Leon Durand MD      History of Present Illness: Patient is a 74 year old male with PMH sig for disorder, hypertension, hyperlipidemia, renal mass who presented for elective right radical nephrectomy.  Patient seen postoperatively.  Complains of some dizziness and lightheadedness.  Denies any chest pain shortness of breath nausea vomiting fever or chills at this time.      PMH:  Past Medical History:   Diagnosis Date    Arrhythmia     \"missed beats\" per pt    High blood pressure     High cholesterol     Hyperlipidemia     HYPERTENSION     OBESITY     Renal disorder     Mass on kidney; getting worked up after EUS with Dr. Cueva 01/18    Seizure disorder (HCC)     Possible seizures, pt worked up by neurology in July 2023    Visual impairment         PSH:  Past Surgical History:   Procedure Laterality Date    COLONOSCOPY  2004 and 2014    ENDOSCOPIC ULTRASOUND - INTERNAL N/A 01/18/2024    Dr. Cueva    OTHER SURGICAL HISTORY      testicle    TONSILLECTOMY          Home Medications:  Outpatient Medications Marked as Taking for the 3/14/24 encounter (Hospital Encounter)   Medication Sig Dispense Refill    atorvastatin 10 MG Oral Tab Take 1 tablet (10 mg total) by mouth nightly.      amLODIPine 5 MG Oral Tab Take 1 tablet (5 mg total) by mouth daily.      lamoTRIgine 25 MG Oral Tab Take 3 tablets (75 mg total) by mouth every morning.      aspirin 325 MG Oral Tab Take 1 tablet (325 mg total) by mouth daily.      metoprolol tartrate 25 MG Oral Tab Take 1 tablet (25 mg total) by mouth 2 (two) times daily. 180 tablet 0    losartan 100 MG Oral Tab Take 1 tablet (100 mg total) by mouth daily. 90 tablet 1       Scheduled Medication:   amLODIPine  5 mg Oral Daily    [START ON 3/15/2024] atorvastatin  10 mg Oral Daily    [START ON 3/15/2024] lamoTRIgine  75 mg Oral QAM    losartan  100 mg Oral Daily    metoprolol tartrate   25 mg Oral 2x Daily(Beta Blocker)    sennosides  17.2 mg Oral Nightly    docusate sodium  100 mg Oral BID    heparin  5,000 Units Subcutaneous Q8H JORGE LUIS     Continuous Infusing Medication:   dextrose in lactated ringers 125 mL/hr at 03/14/24 1345     PRN Medication:polyethylene glycol (PEG 3350), ondansetron, metoclopramide, oxyCODONE **OR** oxyCODONE, HYDROmorphone **OR** HYDROmorphone, melatonin, benzocaine-menthol     ALL:  No Known Allergies     Soc Hx:  Social History     Tobacco Use    Smoking status: Never    Smokeless tobacco: Never   Substance Use Topics    Alcohol use: Yes     Comment: socially, once a week        Fam Hx  Family History   Problem Relation Age of Onset    Cancer Father         bladder    Hypertension Mother     Lipids Mother        Review of Systems  Comprehensive ROS reviewed and negative except for what's stated above.  Including negative for chest pain, shortness of breath, syncope.       OBJECTIVE:  /78 (BP Location: Right arm)   Pulse 77   Temp 98.4 °F (36.9 °C) (Oral)   Resp 16   Ht 6' 1\" (1.854 m)   Wt 250 lb (113.4 kg)   SpO2 98%   BMI 32.98 kg/m²   General:  Alert, no distress, appears stated age.  Rg with clear urine   Head:  Normocephalic, without obvious abnormality, atraumatic.   Eyes:  Sclera anicteric, No conjunctival pallor, EOMs intact.    Nose: Nares normal. Septum midline. Mucosa normal. No drainage.   Throat: Lips, mucosa, and tongue normal. Teeth and gums normal.   Neck: Supple, symmetrical, trachea midline   Lungs:   Clear to auscultation bilaterally. Normal effort   Chest wall:  No tenderness or deformity.   Heart:  Regular rate and rhythm, S1, S2 normal, no murmur, rub or gallop appreciated   Abdomen:   Soft, non-tender around incision sites, incisions appear clean dry and intact, mildly distended postoperatively.  Hypoactive bowel sounds.   Extremities: Extremities normal, atraumatic, no cyanosis or edema.   Skin: Skin color, texture, turgor normal.  No rashes or lesions.    Neurologic: Normal strength, no focal deficit appreciated       Diagnostics:   CBC/Chem  Recent Labs   Lab 03/14/24  1228   WBC 11.0   HGB 15.2   MCV 90.1   .0       Recent Labs   Lab 03/14/24  1228      K 3.5      CO2 21.0   BUN 17   CREATSERUM 1.06   *   CA 7.9*       No results for input(s): \"ALT\", \"AST\", \"ALB\", \"AMYLASE\", \"LIPASE\", \"LDH\" in the last 168 hours.    Invalid input(s): \"ALPHOS\", \"TBIL\", \"DBIL\", \"TPROT\"    No results for input(s): \"TROP\" in the last 168 hours.    Radiology: No results found.      ASSESSMENT / PLAN:    Patient is a 74 year old male with PMH sig for disorder, hypertension, hyperlipidemia, renal mass who presented for elective right radical nephrectomy.      Right renal mass  Status post right radical nephrectomy, POD 0  - IV/p.o. pain medications  - Bowel regimen  - Monitor for acute blood loss anemia, follow CBC and kidney function  - Urology following, recommendations reviewed    Hypertension  - Continue losartan, metoprolol, amlodipine    Hyperlipidemia  - Continue statin    Seizure disorder  - Continue Lamictal    Prophylaxis:   DVT with SCDs, heparin subcu  Atrophy Prophylaxis ambulate as tolerated  Lines/Monitors:     Dispo: Admit  Code status:     Patient and/or patient's family given opportunity to ask questions and note understanding and agreeing with therapeutic plan as outlined    Thank you for allowing me to participate in the care of this patient.     Thank You,  DO JULIO CESAR [place] [facility]ist  Pager   Answering Service number: [phone]

## 2024-03-14 NOTE — OPERATIVE REPORT
OPERATIVE REPORT    PATIENT NAME: Musa Chavez  YOB: 1950  DATE OF SERVICE: 3/14/2024    PREOPERATIVE DIAGNOSIS:  Right renal mass    POSTOPERATIVE DIAGNOSIS:  Same    PROCEDURES PERFORMED:  Robotic-assisted laparoscopic right radical nephrectomy  Intraoperative ultrasound    SURGEON:  Gideon Anton MD    ASSISTANTS:  DEAN dArian    ANESTHESIA:  General Endotracheal    ANTIBIOTIC PROPHYLAXIS:  Ancef    ESTIMATED BLOOD LOSS:  50mL    SPECIMENS:  Right Kidney    DRAINS:  16F Rg catheter (10mL in balloon)    COMPLICATIONS:  No immediate complications    FINDINGS:  Adherent perinephric fat.  Endophytic tumor with poorly defined margins on intraoperative ultrasound and broad abutment of collecting system and parapelvic renal cyst.  Decision was made to proceed with a radical nephrectomy instead of a partial nephrectomy.     INDICATIONS FOR PROCEDURE:  Mr. Chavez is a 74 year old gentleman who was incidentally found to have a right renal mass. He was counseled about treatment options and he elected to proceed with surgical intervention. WE discussed the option of partial vs. Radical nephrectomy. His tumor was somewhat atypical appearing on imaging, with ill defined margins.  We discussed that if a partial nephrectomy could not be performed, then a radical nephrectomy would be done instead. He was in agreement with the plan.     We discussed the robotic nephroureterectomy procedure in detail.  The procedure itself, as well as the expected convalescent period were reviewed.  Risks of this procedure include, but are not limited to bleeding (sometimes requiring transfusion), infection, damage to abdominal structures including liver, spleen, small bowel, colon, or the great vessels, urinary fistula, renal failure (rarely requiring dialysis), as well as risks of general anesthesia.     DESCRIPTION OF PROCEDURE:  After reviewing the indications for the procedure, informed consent was reviewed and signed  by the patient.    The patient was brought to the operating room and placed in the supine position on the OR table.  SCD's were applied and all pressure points were carefully padded. Perioperative antibiotics and 5000U subcutaneous heparin were given.  At this point, general endotracheal anesthesia was successfully induced.  A pelletier catheter was placed and the bladder was drained fully.  The patient was then positioned in the left lateral decubitus position.  The bed was flexed slightly to open the space between the costal margin and the ASIS.  All pressure points were carefully padded.  An axillary roll was placed.  The patient was then secured to the bed using silk tape. They were then prepped and draped in the usual sterile fashion using chlorhexidine solution.     We then performed a surgical time out to confirm the correct patient, position, and laterality.  Everyone in the room was in agreement.      I began by inserting a Veress needle into the right upper quadrant.  A drop test was performed and there was no return of blood or enteric contents.  I then insufflated to 15mmHG and removed the Veress needle.  At this point an 8mm robotic trocar was placed into the right upper quadrant at the same position as the Veress.  A 0 degree camera was inserted and the peritoneum was inspected carefully. There were no adhesions and no other pathology appreciated.  3 additional 8mm robotic ports were in the abdomen placed in a linear fashion to the right of the patient's midline.  These were done under direct vision and there was no evidence of injury to the intraperitoneal contents.  An additional 12mm assistant port was placed superior to the umbilicus.  A 5mm port was placed sub-xyphoid to serve as a liver retractor.     At this point the table was \"airplaned\" towards the patient's left side to facilitate gravity retraction of the bowel.  The robot was then docked and all instruments were placed under direct vision.      I began incising along the ascending colon along the line of Toldt to reflect the colon medially to expose the retroperitoneum.  I created a plane between Gerota's fascia and the colonic mesentery.      I then encountered the patient's duodenum and Kocherized it medially in a sharp fashion, with care to avoid electrocautery nearby.  I then identified the patient's inferior vena cava and dissected along the anterior surface of this both cephalad and caudad.  The gonadal vein was identified and reflected medially.  I then dissected along the lateral border of the IVC and identified the psoas muscle.  At this point, I retracted the ureter and lower pole of Gerota's fascia anterior and dissected along the psoas muscle until I encountered the patient's renal hilum.  The hilar vessels were dissected separately. There was a branched artery just posterior to the vein.  Both branches of the artery were isolated, so that bulldog clamps could later be placed if a partial nephrectomy was to be performed.     At this point, I defatted the kidney along its anterior surface.  The patient had very adherent perinephric fat, making this dissection tedious.  I cleared off the upper pole of the kidney and then used a drop in intraoperative ultrasound probe to assess the tumor. His tumor was endophytic, and there was no external cues as to where the tumor was on the capsule of the kidney. The ultrasound probe was used to inspect the kidney and I could identify the tumor, however I felt the margins were very poorly defined, and there appeared to be broad abutment of the collecting system.  I elected at this point to convert to a radical nephrectomy, as I was not confident that I would be able to resect the tumor with negative surgical margins.     The renal hilum was already dissected.  I ligated the arterial branches using hemo lock clips, two on the stay side, and one on the kidney side.  The vein was ligated using a 60mm vascular  staple load. The ureter was ligated using bipolar cautery.     I then created a plane between the adrenal gland and the upper pole of the kidney using electrocautery.  I then dissected the peritoneal attachments off the upper pole of the kidney, and then continued this laterally until the kidney was completely mobilized.     I then extended the 12mm assistant port to be wide enough to accommodate extraction of the kidney. The kidney was removed and was sent for pathologic evaluation as \"right kidney.\" Fascia was closed using running 0 PDS sutures. We reinserted the robotic camera, and ensured no bowel or omentum was snared in the fascial closure.     The abdomen was completely desufflated and all robotic ports were removed.  Skin was closed using subcuticular 4-0 monocryl sutures.  Derma-Bond was applied.      Our sponge, instrument, and needle counts were correct x2.  The patient was awoken from anesthesia and transferred to the recovery room in stable condition.     Please note, I was present for and performed the entirety of the procedure. DEAN Adrian served as my bedside assistant for the entirety of the procedure.    PLAN:  -Admit to observation for 1-2 nights.    Gideon Anton MD  [facility]  Office: [phone]

## 2024-03-14 NOTE — ANESTHESIA POSTPROCEDURE EVALUATION
[facility] Patient Status:  Inpatient   Age/Gender 74 year old male MRN [id number]   Location [facility] SURGERY Attending Gideon Anton MD   Hosp Day # 0 PCP Leon Durand MD       Anesthesia Post-op Note    XI ROBOT-ASSISTED LAPAROSCOPIC RIGHT RADICAL NEPHRECTOMY , INTRAOPERATIVE ULTRASOUND    Procedure Summary       Date: 03/14/24 Room / Location:  MAIN OR 08 /  MAIN OR    Anesthesia Start: 0809 Anesthesia Stop: 1216    Procedure: XI ROBOT-ASSISTED LAPAROSCOPIC RIGHT RADICAL NEPHRECTOMY , INTRAOPERATIVE ULTRASOUND (Right: Abdomen) Diagnosis: (RIGHT RENAL MASS)    Surgeons: Gideon Anton MD Anesthesiologist: Lalit Martinez MD    Anesthesia Type: general ASA Status: 3            Anesthesia Type: general    Vitals Value Taken Time   /90 03/14/24 1216   Temp 97.0 03/14/24 1216   Pulse 79 03/14/24 1216   Resp 14 03/14/24 1216   SpO2 100 % 03/14/24 1216   Vitals shown include unfiled device data.    Patient Location: PACU    Anesthesia Type: general    Airway Patency: patent    Postop Pain Control: adequate    Mental Status: preanesthetic baseline    Nausea/Vomiting: none    Cardiopulmonary/Hydration status: stable euvolemic    Complications: no apparent anesthesia related complications    Postop vital signs: stable    Dental Exam: Unchanged from Preop    Patient to be discharged from PACU when criteria met.

## 2024-03-14 NOTE — BRIEF OP NOTE
Pre-Operative Diagnosis: RIGHT RENAL MASS     Post-Operative Diagnosis: RIGHT RENAL MASS      Procedure Performed:   XI ROBOT-ASSISTED LAPAROSCOPIC RIGHT RADICAL NEPHRECTOMY , INTRAOPERATIVE ULTRASOUND    Surgeon(s) and Role:     * Gideon Anton MD - Primary    Assistant(s):  Surgical Assistant.: Dayami Velez CSA     Surgical Findings: Adherent perinephric fat.  Renal mass had poorly defined margins on IOUS.  Decision made to proceed with radical nephrectomy.     Specimen: Right kidney     Estimated Blood Loss: Blood Output: 50 mL (3/14/2024 11:44 AM)    Dictation Number:  Will type later.    Gideon Anton MD  3/14/2024  11:57 AM

## 2024-03-14 NOTE — H&P
Urology Pre OP H&P   Encounter Date: 3/14/2024    Chief Complaint:   Right renal mass, suspicious for renal cell carcinoma.    History of Present Illness:   Musa Chavez is a 74 year old male who presents today for treatment of a right renal mass.     History of a bladder tumor, s/p TURBT with me in December, 2023.  Pathology revealed a PUNLMP. He also had a known right renal mass.     CT A&P on 1/30/2024 showed a 4.2 cm solid, enhancing right renal mass, suspicious for renal cell carcinoma.      He elected to proceed with a robotic-assisted right radical nephrectomy.      He denies a prior abdominal surgical history. No anticoagulation or antiplatelet medications.     Past Medical History:   Diagnosis Date    Arrhythmia     \"missed beats\" per pt    High blood pressure     High cholesterol     Hyperlipidemia     HYPERTENSION     OBESITY     Renal disorder     Mass on kidney; getting worked up after EUS with Dr. Cueva 01/18    Seizure disorder (HCC)     Possible seizures, pt worked up by neurology in July 2023    Visual impairment      Past Surgical History:   Procedure Laterality Date    COLONOSCOPY  2004 and 2014    ENDOSCOPIC ULTRASOUND - INTERNAL N/A 01/18/2024    Dr. Cueva    OTHER SURGICAL HISTORY      testicle    TONSILLECTOMY       Allergies:  Patient has no known allergies.  Current Outpatient Medications   Medication Sig Dispense Refill    amLODIPine 5 MG Oral Tab Take 1 tablet (5 mg total) by mouth daily.      lamoTRIgine 25 MG Oral Tab Take 3 tablets (75 mg total) by mouth every morning.      aspirin 325 MG Oral Tab Take 1 tablet (325 mg total) by mouth daily.      metoprolol tartrate 25 MG Oral Tab Take 1 tablet (25 mg total) by mouth 2 (two) times daily. 180 tablet 0    atorvastatin 10 MG Oral Tab Take 1 tablet (10 mg total) by mouth daily. 90 tablet 0    losartan 100 MG Oral Tab Take 1 tablet (100 mg total) by mouth daily. 90 tablet 1     Social History:  He reports that he has never smoked. He  has never used smokeless tobacco. He reports current alcohol use. He reports that he does not use drugs.    Family History   Problem Relation Age of Onset    Cancer Father         bladder    Hypertension Mother     Lipids Mother      Review of Systems:   General: Denies anorexia, weight loss, fevers, chills, night sweats, or other constitutional symptoms.   Neurologic: Denies headaches, stiff neck, photophobia, numbness, or weakness of extremities.   Psychiatric: Denies anxiety, depression.  Eyes: Denies vision changes.  Skin: Denies skin changes or rash.  Cardiac: Denies chest pain, palpitations, or orthopnea.  Pulmonary: Denies cough, hemoptysis, shortness of breath, or dyspnea on exertion.  GI: Denies abdominal pain, nausea, vomiting, or changes in bowel movements.  Musculoskeletal: Denies extremity pain, weakness.  : See HPI.    Physical Examination:   Height 6' 1\" (1.854 m), weight 250 lb (113.4 kg).  Body mass index is 32.98 kg/m².  General: Awake, Alert, Oriented.  Well-nourished. Appears stated age.  Neurologic: No focal deficits. Normal gait.  HEENT: EOMI. No scleral icterus.  Cardiac: Regular rate and rhythm.  Respiratory: Non-labored respirations.  Abdomen: Soft, Non-tender, non-distended.  No palpable masses. No costovertebral angle tenderness.  Extremities: Warm, well-perfused.  No palpable edema.  Skin: Normal-appearing. No rash or lesions.  Genitourinary: Deferred    Laboratory Review:   Labs reviewed     Radiology Review:   I have personally reviewed all pertinent imaging.    CT A&P 1/30/2024:  : Redemonstrated hyperenhancing right renal mass is seen measuring 4.2 x 2.8 cm, not significantly changed when remeasured on the prior MRI. Small bilateral renal cysts are noted. There is no hydronephrosis or nephrolithiasis. The bladder is normal. The prostate is present.     MRI Pancreatic Cyst 9/12/2023:  1.  Solid, enhancing right renal cortical mass, highly suspicious for RCC. No right renal hilar    lymphadenopathy or right renal vein invasion. Bilateral renal cysts.   2.  Probable tiny 2 mm gallbladder polyp.   3.  Colonic diverticulosis.   4.  Multiple liver cysts and a few other tiny T2-hyperintense foci in the liver which are too small   to characterize.   5.  Multiple pancreas cystic lesions, as described above, measuring up to 2.4 cm, without aggressive features. IPMN cannot be excluded.     CT A&P 7/10/2023:  Solid enhancing lesion along the lateral upper pole cortex of the right kidney with ill-defined   margins, concerning for renal cell carcinoma with possible hemorrhagic component accounting for asymmetric perinephric soft tissue stranding and intrarenal calyceal hyperdensity. There is   asymmetric delayed nephrogram on the right of uncertain etiology with no associated   hydroureteronephrosis.     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 (See comment)      Assessment:   In summary, Musa Chavez is a 74 year old male with a 4.2cm right renal mass, suspicious for renal cell carcinoma. He presents today to undergo a robotic-assisted right partial nephrectomy, possible right radical nephrectomy.     Plan:   After careful personal review of all the outside medical records, films and laboratory studies, as well as the patient's stated medical history as outlined above, I discussed with the patient at length the finding of a solid, enhancing renal lesion on the imaging studies provided.      We discussed the differential diagnosis of solid, enhancing renal masses and I explained that roughly 75-80% of these lesions represent renal cell carcinoma (RCC), while 20-25% of these lesions represent a benign tumor such as an oncocytoma or angiomyolipoma (AML).  The patient understands that in the majority of cases, if there are solid components, it is not possible to discern the type of tumor involving the kidney  and that renal mass biopsy (RMB) is not typically recommended.  The medical reasoning and rationale for this was discussed in detail.      We also discussed that renal cell carcinoma is a heterogeneous disease and that the ultimate prognosis of their disease depends largely on pathologic features such as histology, nuclear grade and clinical/pathologic stage.  We reviewed the various clinical and pathological stages and assigned the patient a clinical stage based on their clinical and radiographic evaluation.    We had a lengthy discussion regarding the various treatment options for incidental renal masses.  These include surgery, percutaneous ablation (not advised for cystic lesions) and/or active surveillance.  Each of these strategies and the medical rationale as well as the risks and benefits were discussed in detail.      Regarding radical nephrectomy, they understand that this involves removal of the affected kidney and may include adjacent lymph nodes and/or the ipsilateral adrenal gland.  The various surgical approaches and incisions were discussed.  The risks of the surgery were discussed in detail including medical and anesthetic complications including, but not limited to bleeding, infection, the possible need for blood transfusion, or adjacent organ involvement or injury.  They were also made aware that their overall kidney function may be impaired and that their future renal reserve will be less.  We reviewed the risks for immediate or delayed, temporary or permanent dialysis (renal replacement therapy) and the risks in the context of their history.  The expected hospital and post-operative courses were reviewed.      Regarding nephron sparing surgery (NSS) or partial nephrectomy (PN), the rationale for this approach was outlined in detail including the way this operation is performed and that frozen sections may be obtained in the OR at the time of surgery to assure complete resection.  The oncologic  results and reasoning for elective, relative and absolute indications for NSS were discussed in detail, as were the additional risks of urinary fistula formation, loss of renal function, the need for temporary or permanent dialysis, the need for a temporary drain at the operative site and the rare need for conversion to a radical nephrectomy at the time of surgery, or in a delayed fashion.  We reviewed the risks for immediate or delayed, temporary or permanent dialysis (renal replacement therapy) and the risks in the context of their history.  The expected hospital and post-operative courses were reviewed.      To OR for robotic-assisted right partial nephrectomy with intraoperative ultrasound, possible right radical nephrectomy.      I have discussed the risks, benefits and alternatives to the proposed procedure/treatment plan with the patient.  Our discussion also included the risks and benefits of the alternatives treatment options, including doing nothing. They were encouraged to ask questions and all questions were answered to their satisfaction.  At the end of our discussion they gave their verbal consent to the proposed procedure/treatment plan.    Gideon Anton MD  [facility]  Department of Urology  Office: [phone]

## 2024-03-14 NOTE — ANESTHESIA PROCEDURE NOTES
Regional Block    Performed by: Lalit Martinez MD  Authorized by: Lalit Martinez MD      General Information and Staff    Start Time:   Anesthesiologist:  Lalit Martinez MD  Performed by:  Anesthesiologist  Patient Location:  OR    Block Placement: Post Induction  Site Identification: real time ultrasound guided and image stored and retrievable    Block site/laterality marked before start: site marked  Reason for Block: at surgeon's request and post-op pain management    Preanesthetic Checklist: 2 patient identifers, IV checked, risks and benefits discussed, monitors and equipment checked, pre-op evaluation, timeout performed, anesthesia consent, sterile technique used, no prohibitive neurological deficits and no local skin infection at insertion site      Procedure Details    Patient Position:  Supine  Prep: ChloraPrep    Monitoring:  Cardiac monitor, continuous pulse ox and blood pressure cuff  Block Type:  TAP  Laterality:  Right  Injection Technique:  Single-shot    Needle    Needle Type:  Short-bevel and echogenic  Needle Gauge:  21 G  Needle Length:  110 mm  Needle Localization:  Ultrasound guidance  Reason for Ultrasound Use: appropriate spread of the medication was noted in real time and no ultrasound evidence of intravascular and/or intraneural injection            Assessment    Injection Assessment:  Good spread noted, negative resistance, negative aspiration for heme, incremental injection and low pressure  Heart Rate Change: No    - Patient tolerated block procedure well without evidence of immediate block related complications.     Medications      Additional Comments    R two-point subcostal TAP block performed with 20cc of 0.375% bupivacaine + 5 mcg/ml epi + 2mg decadron injected at each site under ultrasound guidance, 40cc total.

## 2024-03-14 NOTE — ANESTHESIA PREPROCEDURE EVALUATION
PRE-OP EVALUATION    Patient Name: Musa Chavez    Admit Diagnosis: RIGHT RENAL MASS    Pre-op Diagnosis: RIGHT RENAL MASS    XI ROBOT-ASSISTED LAPAROSCOPIC RIGHT PARTIAL NEPHRECTOMY, INTRAOPERATIVE ULTRASOUND, POSSIBLE RIGHT RADICAL NEPHRECTOMY    Anesthesia Procedure: XI ROBOT-ASSISTED LAPAROSCOPIC RIGHT PARTIAL NEPHRECTOMY, INTRAOPERATIVE ULTRASOUND, POSSIBLE RIGHT RADICAL NEPHRECTOMY (Right)    Surgeon(s) and Role:     * Gideon Anton MD - Primary    Pre-op vitals reviewed.        Body mass index is 32.98 kg/m².    Current medications reviewed.  No current facility-administered medications on file prior to encounter.     Current Outpatient Medications on File Prior to Encounter   Medication Sig Dispense Refill    amLODIPine 5 MG Oral Tab Take 1 tablet (5 mg total) by mouth daily.      lamoTRIgine 25 MG Oral Tab Take 3 tablets (75 mg total) by mouth every morning.      aspirin 325 MG Oral Tab Take 1 tablet (325 mg total) by mouth daily.      metoprolol tartrate 25 MG Oral Tab Take 1 tablet (25 mg total) by mouth 2 (two) times daily. 180 tablet 0    atorvastatin 10 MG Oral Tab Take 1 tablet (10 mg total) by mouth daily. 90 tablet 0    losartan 100 MG Oral Tab Take 1 tablet (100 mg total) by mouth daily. 90 tablet 1       Allergies: Patient has no known allergies.      Anesthesia Evaluation    Patient Active Problem List   Diagnosis    Essential hypertension    Hyperlipidemia, unspecified hyperlipidemia type    Irregular cardiac rhythm    Frequent PVCs        Past Medical History:   Diagnosis Date    Arrhythmia     \"missed beats\" per pt    High blood pressure     High cholesterol     Hyperlipidemia     HYPERTENSION     OBESITY     Renal disorder     Mass on kidney; getting worked up after EUS with Dr. Cueva 01/18    Seizure disorder (HCC)     Possible seizures, pt worked up by neurology in July 2023    Visual impairment           Past Surgical History:   Procedure Laterality Date    COLONOSCOPY  2004 and 2014     ENDOSCOPIC ULTRASOUND - INTERNAL N/A 01/18/2024    Dr. Cueva    OTHER SURGICAL HISTORY      testicle    TONSILLECTOMY       Social History     Socioeconomic History    Marital status:    Tobacco Use    Smoking status: Never    Smokeless tobacco: Never   Vaping Use    Vaping Use: Never used   Substance and Sexual Activity    Alcohol use: Yes     Comment: socially, once a week    Drug use: No     History   Drug Use No     Available pre-op labs reviewed.               Airway      Mallampati: III  Mouth opening: >3 FB  TM distance: 4 - 6 cm  Neck ROM: full Cardiovascular      Rhythm: regular  Rate: normal     Dental    Dentition appears grossly intact         Pulmonary    Pulmonary exam normal.  Breath sounds clear to auscultation bilaterally.               Other findings              ASA: 3   Plan: general  NPO status verified and     Post-procedure pain management plan discussed with surgeon and patient.    Comment:  I explained intrinsic risks of general anesthesia, including nausea, dental damage, sore throat, mouth injury,and hoarseness from airway management.  All questions were answered and understanding was demonstrated of risks.  Informed permission was obtained to proceed as documented in the signed consent form.      Plan/risks discussed with: patient  Use of blood product(s) discussed with: patient    Consented to blood products.          Present on Admission:  **None**

## 2024-03-14 NOTE — ANESTHESIA PROCEDURE NOTES
Arterial Line    Performed by: Lalit Martinez MD  Authorized by: Lalit Martinez MD    General Information and Staff    Procedure Start:   Procedure End:  3/14/2024 8:25 AM  Anesthesiologist:  Lalit Martinez MD  Performed By:  Anesthesiologist  Patient Location:  OR  Indication: continuous blood pressure monitoring    Site Identification: surface landmarks    Preanesthetic Checklist: 2 patient identifiers, IV checked, risks and benefits discussed, monitors and equipment checked, pre-op evaluation, timeout performed, anesthesia consent and sterile technique used    Procedure Details    Catheter Size:  20 G  Catheter Length:  1 and 3/4 inch  Catheter Type:  Angiocath  Seldinger Technique?: No    Laterality:  Left  Site:  Radial artery  Site Prep: alcohol swabs    Line Secured:  Tape and Tegaderm    Assessment    Events: patient tolerated procedure well with no complications      Medications      Additional Comments

## 2024-03-14 NOTE — PROGRESS NOTES
Alert & oriented x4. VSS on room air. Rg. Tolerating diet. Ambulates with standby assist. Laps x5 and midline incision with skin glue all c/d/I. Denies nausea/chest pain/SOB. Pain controlled per MAR. Patient updated on plan of care. Questions and concerns addressed. Safety precautions in place. Frequent rounds performed.

## 2024-03-14 NOTE — ANESTHESIA PROCEDURE NOTES
Airway  Date/Time: 3/14/2024 8:16 AM  Urgency: elective      General Information and Staff    Patient location during procedure: OR  Anesthesiologist: Lalit Martinez MD  Performed: anesthesiologist   Performed by: Lalit Martinez MD  Authorized by: Lalit Martinez MD      Indications and Patient Condition  Indications for airway management: anesthesia  Sedation level: deep  Preoxygenated: yes  Patient position: sniffing  Mask difficulty assessment: 1 - vent by mask    Final Airway Details  Final airway type: endotracheal airway      Successful airway: ETT  Cuffed: yes   Successful intubation technique: direct laryngoscopy  Facilitating devices/methods: intubating stylet  Endotracheal tube insertion site: oral  Blade: Ariadne  Blade size: #4  ETT size (mm): 8.0    Cormack-Lehane Classification: grade IIA - partial view of glottis  Placement verified by: capnometry   Measured from: teeth  ETT to teeth (cm): 23  Number of attempts at approach: 1

## 2024-03-14 NOTE — ANESTHESIA PROCEDURE NOTES
Peripheral IV  Date/Time: 3/14/2024 8:27 AM  Inserted by: Lalit Martinez MD    Placement  Needle size: 16 G  Laterality: left  Location: arm  Site prep: alcohol  Technique: anatomical landmarks  Attempts: 1

## 2024-03-15 LAB
ANION GAP SERPL CALC-SCNC: 4 MMOL/L (ref 0–18)
BUN BLD-MCNC: 15 MG/DL (ref 9–23)
CALCIUM BLD-MCNC: 9.4 MG/DL (ref 8.5–10.1)
CHLORIDE SERPL-SCNC: 111 MMOL/L (ref 98–112)
CO2 SERPL-SCNC: 24 MMOL/L (ref 21–32)
CREAT BLD-MCNC: 1.51 MG/DL
EGFRCR SERPLBLD CKD-EPI 2021: 49 ML/MIN/1.73M2 (ref 60–?)
ERYTHROCYTE [DISTWIDTH] IN BLOOD BY AUTOMATED COUNT: 11.8 %
GLUCOSE BLD-MCNC: 118 MG/DL (ref 70–99)
HCT VFR BLD AUTO: 46.3 %
HGB BLD-MCNC: 16.5 G/DL
MAGNESIUM SERPL-MCNC: 2.5 MG/DL (ref 1.6–2.6)
MCH RBC QN AUTO: 33.1 PG (ref 26–34)
MCHC RBC AUTO-ENTMCNC: 35.6 G/DL (ref 31–37)
MCV RBC AUTO: 93 FL
OSMOLALITY SERPL CALC.SUM OF ELEC: 290 MOSM/KG (ref 275–295)
PLATELET # BLD AUTO: 168 10(3)UL (ref 150–450)
POTASSIUM SERPL-SCNC: 4 MMOL/L (ref 3.5–5.1)
RBC # BLD AUTO: 4.98 X10(6)UL
SODIUM SERPL-SCNC: 139 MMOL/L (ref 136–145)
WBC # BLD AUTO: 13.2 X10(3) UL (ref 4–11)

## 2024-03-15 PROCEDURE — 85027 COMPLETE CBC AUTOMATED: CPT | Performed by: UROLOGY

## 2024-03-15 PROCEDURE — 83735 ASSAY OF MAGNESIUM: CPT | Performed by: UROLOGY

## 2024-03-15 PROCEDURE — 80048 BASIC METABOLIC PNL TOTAL CA: CPT | Performed by: UROLOGY

## 2024-03-15 RX ORDER — HYDROCODONE BITARTRATE AND ACETAMINOPHEN 5; 325 MG/1; MG/1
1 TABLET ORAL EVERY 6 HOURS PRN
Status: DISCONTINUED | OUTPATIENT
Start: 2024-03-15 | End: 2024-03-15

## 2024-03-15 RX ORDER — HYDROCODONE BITARTRATE AND ACETAMINOPHEN 5; 325 MG/1; MG/1
1 TABLET ORAL EVERY 6 HOURS PRN
Status: DISCONTINUED | OUTPATIENT
Start: 2024-03-15 | End: 2024-03-16

## 2024-03-15 RX ORDER — PSEUDOEPHEDRINE HCL 30 MG
100 TABLET ORAL 2 TIMES DAILY PRN
Qty: 30 CAPSULE | Refills: 0 | Status: SHIPPED | OUTPATIENT
Start: 2024-03-15

## 2024-03-15 RX ORDER — HYDROCODONE BITARTRATE AND ACETAMINOPHEN 5; 325 MG/1; MG/1
1 TABLET ORAL EVERY 6 HOURS PRN
Qty: 10 TABLET | Refills: 0 | Status: SHIPPED | OUTPATIENT
Start: 2024-03-15

## 2024-03-15 RX ORDER — HYDROCODONE BITARTRATE AND ACETAMINOPHEN 5; 325 MG/1; MG/1
2 TABLET ORAL EVERY 6 HOURS PRN
Status: DISCONTINUED | OUTPATIENT
Start: 2024-03-15 | End: 2024-03-16

## 2024-03-15 RX ORDER — HYDROCODONE BITARTRATE AND ACETAMINOPHEN 5; 325 MG/1; MG/1
2 TABLET ORAL EVERY 4 HOURS PRN
Status: DISCONTINUED | OUTPATIENT
Start: 2024-03-15 | End: 2024-03-15

## 2024-03-15 RX ORDER — ASPIRIN 325 MG
325 TABLET ORAL DAILY
Status: SHIPPED | COMMUNITY
Start: 2024-03-22

## 2024-03-15 NOTE — DISCHARGE INSTRUCTIONS
[facility] GENERAL UROLOGY POST OPERATIVE INSTRUCTIONS    The time after surgery is one that can be interesting, scary, and full of questions.  Here are some general items to help you navigate the post-operative period.  At any time, should you have any questions, don't hesitate to contact our office for additional assistance.    DIET:  Unless otherwise directed, resume your regular healthy diet.  Return to any medically-directed diets if necessary (renal diet, diabetic diet, etc.).  Drink plenty of fluids.  Most fluids are all right, including small amounts of alcoholic beverages if desired (but not recommended if you are taking prescription pain medication or other medications that you may not use with alcohol ingestion.)    ACTIVITY:  Avoid strenuous physical exercise, including heavy lifting/pushing/pulling (>20 lbs.) and sexual intercourse until released by your doctor.  This is generally a period of four to eight weeks for open or laparoscopic surgical procedures, or 1-2 weeks for outpatient procedures.  Driving is generally okay once pain free and off all prescription pain medications; of course, you may be a passenger for short rides.  Going out to the library, to supper and a movie, or other light activity is even encouraged if you feel well.  Extended travel is not recommended until you are released by your surgeon.  It is okay to go up and down stairs as long as you go slowly.  If you have any surgical clips or sutures, you may be allowed to take routine showers, but should not submerge your incisions in standing water (pool, tub, etc.) for 21 days.  Avoid rubbing or scrubbing the incision(s).  Rather, allow soapy water to pass over the incisions and simply pat them dry.    VOIDING:  Many urology patients will be discharged with a catheter and will need additional instructions (see below); however, for patients without a catheter, please void whenever the urge presents itself.  Do not hold your  urine.  You may be getting up in the middle of the night or urinating more frequently during the daytime after any urologic procedure.  This is normal after many types of surgeries, but a more normal urinary voiding pattern should resume within days, or rarely within a few weeks.  If you are unable to urinate altogether, please phone our nurse for further instructions, or seek attention in an immediate/urgent/or emergent setting.      BLOOD IN THE URINE:  You may have some blood in the urine for two to four weeks after some urologic procedures.  This is usually not serious and a normal part of healing from some urinary surgeries.  Should you have persistent blood, large clots, or the inability to void due to large clots, notify you urology team.    REST:  You should get plenty of rest after any procedure.  However, use your bed as you did prior to your surgery - to take a small nap, and to sleep in the evenings.  Do not lie around in bed all day as this can actually result in post anesthesia complications.  We encourage you to get out of bed daily, take multiple light walks, strolling outdoors if desired.  It is well known that patients that lie or sit all day have more wound complications, at times sever complications from blood clots in the legs, pneumonias, and other challenges.    CONSTIPATION:  Please work to avoid constipation.  We do not recommend enemas or most rectal suppositories after most urologic surgery.  Daily use of Colace or Docusate over-the-counter (100 mg three times daily with 8 oz water) is recommended for the month after surgery - especially if taking prescription pain medication.  Daily prune juice and increased intake of fruits and vegetables are also helpful to prevent acute constipation.  Acute constipation may necessitate the use of over-the-counter Milk of Magnesia - 30 cc every evening until effect - if needed.    MEDICATIONS:  Unless otherwise directed, resume all of your prior home  medications upon discharge.  The exception may be blood thinners (Coumadin, Warfarin, Plavix, Xeralto, etc.) which are typically held for one week prior to, and after, larger urologic surgeries.  Your surgeon will give the recommended times for these medications to be held so that you may work with the nurse or physician tem that manages your anticoagulation medication.  Should you be prescribed and antibiotic please take as directed until completed.    ASPIRIN MAY BE RESUMED IN 7 DAYS      INCENTIVE SPIROMETER/DEEP BREATHING EXERCISES:  You may be provided with an incentive spirometer (IS) breathing exercise machine while in the hospital.  Your nursing staff will educate you on it's use.  This is a very important piece to post anesthesia pneumonia prevention, so take your IS home with you and continue regular use (10x/hour while awake) for the entire time you recover at home prior rot returning to work or regular activities.    FOLLOW UP CARE:  Please call our office at [phone] to coordinate follow up     NOTIFY OUR OFFICE OF:  - Temperature greater than 101 degrees.  - Accitentally pulling the string and your stent is pulled out.  - Any questions you think are important for your urology team.  - You have challenges with catheter management.    SEEK EMERGENCY CARE with sudden onset of shortness of breath, chest pain, increasing calf or leg pain, or acute condition that you feel needs emergent attention.

## 2024-03-15 NOTE — PROGRESS NOTES
[facility] Hospitalist Progress Note     CC: Hospital Follow up    PCP: Leon Durand MD       Assessment/Plan:     Active Problems:    Right renal mass          Patient is a 74 year old male with PMH sig for disorder, hypertension, hyperlipidemia, renal mass who presented for elective right radical nephrectomy.       Right renal mass  Status post right radical nephrectomy, POD 1  - IV/p.o. pain medications  - Bowel regimen  - Monitor for acute blood loss anemia, CBC reviewed hemoglobin stable  - Urology following, recommendations reviewed     VIKTOR  - Creatinine 1.51 in the setting of nephrectomy  - Follow BMP, possibly hold losartan tomorrow    Hypertension  - Continue metoprolol, amlodipine; hold losartan     Hyperlipidemia  - Continue statin     Seizure disorder  - Continue Lamictal     Prophylaxis:   DVT with SCDs, heparin subcu  Atrophy Prophylaxis ambulate as tolerated  Lines/Monitors:      Dispo: Pending clinical status  Code status:      Patient and/or patient's family given opportunity to ask questions and note understanding and agreeing with therapeutic plan as outlined     Thank you for allowing me to participate in the care of this patient.      Thank You,  Keanu Doan DO     [facility] Hospitalist  Pager   Answering Service number: [phone]     Subjective:     Seen and examined at bedside.  Sitting upright in a chair.  Ambulated today.  Passing gas but has not had a bowel movement yet.    OBJECTIVE:    Blood pressure 147/90, pulse 75, temperature 98.2 °F (36.8 °C), temperature source Oral, resp. rate 18, height 6' 1\" (1.854 m), weight 250 lb (113.4 kg), SpO2 98%.    Temp:  [98 °F (36.7 °C)-98.4 °F (36.9 °C)] 98.2 °F (36.8 °C)  Pulse:  [68-77] 75  Resp:  [16-18] 18  BP: (133-152)/(78-98) 147/90  SpO2:  [93 %-99 %] 98 %      Intake/Output:    Intake/Output Summary (Last 24 hours) at 3/15/2024 1452  Last data filed at 3/15/2024 1250  Gross per 24 hour   Intake 3540 ml   Output 5550 ml   Net -2010 ml       Last  3 Weights   03/14/24 1346 250 lb (113.4 kg)   03/14/24 0650 250 lb (113.4 kg)   02/21/24 1511 250 lb (113.4 kg)   01/09/24 1044 250 lb (113.4 kg)   11/29/21 0938 230 lb (104.3 kg)       Exam   Gen: No acute distress, alert and oriented x3, no focal neurologic deficits  Heent: NC AT, mucous memb clear, neck supple  Pulm: Lungs clear, normal respiratory effort  CV: Heart with regular rate and rhythm, no peripheral edema  Abd: Abdomen soft, nontender, +distended,  bowel sounds present  MSK: Full range of motion in extremities, no clubbing, no cyanosis  Skin: no rashes or lesions  Neuro: AO*3, motor intact, no sensory deficits  Psyc: appropriate mood and affect      Data Review:       Labs:     Recent Labs   Lab 03/14/24  1228 03/15/24  0550   RBC 4.53 4.98   HGB 15.2 16.5   HCT 40.8 46.3   MCV 90.1 93.0   MCH 33.6 33.1   MCHC 37.3* 35.6   RDW 11.5 11.8   WBC 11.0 13.2*   .0 168.0         Recent Labs   Lab 03/14/24  1228 03/15/24  0550   * 118*   BUN 17 15   CREATSERUM 1.06 1.51*   EGFRCR 75 49*   CA 7.9* 9.4    139   K 3.5 4.0    111   CO2 21.0 24.0       No results for input(s): \"ALT\", \"AST\", \"ALB\", \"AMYLASE\", \"LIPASE\", \"LDH\" in the last 168 hours.    Invalid input(s): \"ALPHOS\", \"TBIL\", \"DBIL\", \"TPROT\"      Imaging:  No results found.      Meds:      amLODIPine  5 mg Oral Daily    atorvastatin  10 mg Oral Daily    lamoTRIgine  75 mg Oral QAM    losartan  100 mg Oral Daily    metoprolol tartrate  25 mg Oral 2x Daily(Beta Blocker)    sennosides  17.2 mg Oral Nightly    docusate sodium  100 mg Oral BID    heparin  5,000 Units Subcutaneous Q8H JORGE LUIS      dextrose in lactated ringers 50 mL/hr at 03/15/24 0542     HYDROcodone-acetaminophen **OR** HYDROcodone-acetaminophen, polyethylene glycol (PEG 3350), ondansetron, metoclopramide, HYDROmorphone **OR** HYDROmorphone, melatonin, benzocaine-menthol

## 2024-03-15 NOTE — PROGRESS NOTES
[facility]  Urology Progress Note    Musa Chavez Patient Status:  Inpatient    1950 MRN [id number]   [facility] 3NW-A Attending Gideon Anton MD   Hosp Day # 1 PCP Leon Durand MD     Subjective:  Musa Chavez is a(n) 74 year old male. Status-post robotic-assisted laparoscopic right radial nephrectomy 3/14/24 with Dr. Anton.      Current complaints: +abdominal pain, gas pain.  No nausea, vomiting, fever, or chills. Tolerating clears.  No flatus.  No CP, SOB, or calf pain.  Ambulating.      Objective:  General appearance: alert, appears stated age, and cooperative  Blood pressure 148/90, pulse 68, temperature 98.4 °F (36.9 °C), temperature source Oral, resp. rate 18, height 6' 1\" (1.854 m), weight 250 lb (113.4 kg), SpO2 98%.  Lungs: non-labored respirations  Abdomen: soft, mildly distended with expected incisional tenderness.    : pelletier catheter in place draining yellow urine (UOP - 6500 mL total yesterday)  Lab Results   Component Value Date    WBC 11.0 2024    HGB 15.2 2024    HCT 40.8 2024    .0 2024    CREATSERUM 1.06 2024    BUN 17 2024     2024    K 3.5 2024     2024    CO2 21.0 2024     2024    CA 7.9 2024       Assessment:    RIGHT RENAL MASS  Status-post robotic-assisted laparoscopic right radial nephrectomy 3/14/24 with Dr. Anton.  Afebrile, VSS  AM labs to be collected  Pathology pending    Plan:    Encouraged ambulation and use of IS x 10/hr  Advance diet as tolerated - go slow, small portions as we await return of bowel function  Pain management  Bowel regimen  Pelletier catheter may be removed  Hospitalist following    Discharge pending patient's clinical course and approval from hospitalist.     Patient to follow-up with Dr. Anton in 2 weeks.      Above discussed with patient, nurse  Will update Dr. Anton.    Kira Easley PA-C  [facility]  Department of  Urology  3/15/2024  5:33 AM    Addendum:  Recent Labs   Lab 03/14/24  1228 03/15/24  0550   RBC 4.53 4.98   HGB 15.2 16.5   HCT 40.8 46.3   MCV 90.1 93.0   MCH 33.6 33.1   MCHC 37.3* 35.6   RDW 11.5 11.8   WBC 11.0 13.2*   .0 168.0     .  Recent Labs   Lab 03/14/24  1228 03/15/24  0550   * 118*   BUN 17 15   CREATSERUM 1.06 1.51*   EGFRCR 75 49*   CA 7.9* 9.4    139   K 3.5 4.0    111   CO2 21.0 24.0     WBC 11 > 13.2  Hgb 15.2 > 16.5  Serum creat 1.06 > 1.51    Aspirin may be resumed in 1 week per Dr. Anton.      Above discussed with Dr. Anton.    Dr. Anton to see patient later today.    JEWELL Davalos  Urology

## 2024-03-15 NOTE — PLAN OF CARE
Patient is A&Ox4. On room air. Denies chest pain and shortness of breath. Lungs are clear bilaterally. Abdomen is soft, distended. Hypoactive bowel sounds. Denies nausea. On clear liquid diet. Tolerating diet. Rg catheter intact draining clear yellow urine. IV fluids running per order. Ambulates with steady gait. Pain controlled-denies need for pain medication at this time. All appropriate safety measures in place. All questions and concerns addressed. Plan of care discussed.    0810: Lungs auscultated with stethoscope. Lungs are clear bilaterally in all lobes when auscultating posterior of patient. When patient leans forward and lungs are auscultated on anterior side, lungs are clear bilaterally. When lungs are auscultated on anterior side and patient is leaning back, upper lobe of right lung has a rumbling sound which sounds like a bowel sound for each inhalation. Patient denies chest pain and shortness of breath. SPO2 reading 97%-98% on room air. This information and assessment was passed along to day shift RN.

## 2024-03-15 NOTE — PLAN OF CARE
Patient AOX4. Denies chest/calf pain. Complaints of gas pain, encouraged ambulation and movement. Incisional area intact. Abdomen soft/distended. Denies flatus. Hypo bowel sounds noted. Lungs clear. IS at bedside, encourage patient to continue to use. Tolerating full liquid diet. Denies nausea. POC discussed, with patient, all questions and concerns addressed. All safety measures in place.

## 2024-03-16 VITALS
TEMPERATURE: 98 F | DIASTOLIC BLOOD PRESSURE: 63 MMHG | HEIGHT: 73 IN | BODY MASS INDEX: 33.13 KG/M2 | SYSTOLIC BLOOD PRESSURE: 104 MMHG | RESPIRATION RATE: 18 BRPM | OXYGEN SATURATION: 97 % | HEART RATE: 72 BPM | WEIGHT: 250 LBS

## 2024-03-16 LAB
ANION GAP SERPL CALC-SCNC: 2 MMOL/L (ref 0–18)
BASOPHILS # BLD AUTO: 0.03 X10(3) UL (ref 0–0.2)
BASOPHILS NFR BLD AUTO: 0.3 %
BUN BLD-MCNC: 17 MG/DL (ref 9–23)
CALCIUM BLD-MCNC: 9.1 MG/DL (ref 8.5–10.1)
CHLORIDE SERPL-SCNC: 108 MMOL/L (ref 98–112)
CO2 SERPL-SCNC: 26 MMOL/L (ref 21–32)
CREAT BLD-MCNC: 1.47 MG/DL
EGFRCR SERPLBLD CKD-EPI 2021: 50 ML/MIN/1.73M2 (ref 60–?)
EOSINOPHIL # BLD AUTO: 0.03 X10(3) UL (ref 0–0.7)
EOSINOPHIL NFR BLD AUTO: 0.3 %
ERYTHROCYTE [DISTWIDTH] IN BLOOD BY AUTOMATED COUNT: 11.9 %
ERYTHROCYTE [DISTWIDTH] IN BLOOD BY AUTOMATED COUNT: 11.9 %
GLUCOSE BLD-MCNC: 107 MG/DL (ref 70–99)
HCT VFR BLD AUTO: 41.4 %
HCT VFR BLD AUTO: 41.4 %
HGB BLD-MCNC: 15.1 G/DL
HGB BLD-MCNC: 15.1 G/DL
IMM GRANULOCYTES # BLD AUTO: 0.03 X10(3) UL (ref 0–1)
IMM GRANULOCYTES NFR BLD: 0.3 %
LYMPHOCYTES # BLD AUTO: 1.9 X10(3) UL (ref 1–4)
LYMPHOCYTES NFR BLD AUTO: 18.3 %
MAGNESIUM SERPL-MCNC: 2.5 MG/DL (ref 1.6–2.6)
MCH RBC QN AUTO: 33.6 PG (ref 26–34)
MCH RBC QN AUTO: 33.6 PG (ref 26–34)
MCHC RBC AUTO-ENTMCNC: 36.5 G/DL (ref 31–37)
MCHC RBC AUTO-ENTMCNC: 36.5 G/DL (ref 31–37)
MCV RBC AUTO: 92 FL
MCV RBC AUTO: 92 FL
MONOCYTES # BLD AUTO: 1.25 X10(3) UL (ref 0.1–1)
MONOCYTES NFR BLD AUTO: 12 %
NEUTROPHILS # BLD AUTO: 7.17 X10 (3) UL (ref 1.5–7.7)
NEUTROPHILS # BLD AUTO: 7.17 X10(3) UL (ref 1.5–7.7)
NEUTROPHILS NFR BLD AUTO: 68.8 %
OSMOLALITY SERPL CALC.SUM OF ELEC: 284 MOSM/KG (ref 275–295)
PLATELET # BLD AUTO: 180 10(3)UL (ref 150–450)
PLATELET # BLD AUTO: 180 10(3)UL (ref 150–450)
POTASSIUM SERPL-SCNC: 3.9 MMOL/L (ref 3.5–5.1)
RBC # BLD AUTO: 4.5 X10(6)UL
RBC # BLD AUTO: 4.5 X10(6)UL
SODIUM SERPL-SCNC: 136 MMOL/L (ref 136–145)
WBC # BLD AUTO: 10.4 X10(3) UL (ref 4–11)
WBC # BLD AUTO: 10.4 X10(3) UL (ref 4–11)

## 2024-03-16 PROCEDURE — 85027 COMPLETE CBC AUTOMATED: CPT | Performed by: UROLOGY

## 2024-03-16 PROCEDURE — 83735 ASSAY OF MAGNESIUM: CPT | Performed by: UROLOGY

## 2024-03-16 PROCEDURE — 80048 BASIC METABOLIC PNL TOTAL CA: CPT | Performed by: UROLOGY

## 2024-03-16 PROCEDURE — 85025 COMPLETE CBC W/AUTO DIFF WBC: CPT | Performed by: STUDENT IN AN ORGANIZED HEALTH CARE EDUCATION/TRAINING PROGRAM

## 2024-03-16 NOTE — PROGRESS NOTES
[facility] UROLOGY PROGRESS NOTE     Musa Chavez Patient Status:  Inpatient    1950 MRN [id number]   Location [facility] 3NW-A Attending Gideon Anton MD   Hosp Day # 2 PCP Leon Durand MD     Subjective:  Musa Chavez is a(n) 74 year old male,  POD  2   R nephrectomy     Hospital course to date: Doing great     Current complaints: NONE    Objective:  Blood pressure 104/63, pulse 72, temperature 97.9 °F (36.6 °C), temperature source Oral, resp. rate 18, height 6' 1\" (1.854 m), weight 250 lb (113.4 kg), SpO2 97%.  General appearance: alert, appears stated age and cooperative  Eyes: sclera non-icteric, no redness   Mouth:  moist oral mucosa, no lesions  Lungs: Unlabored respirations  Abdomen: Soft, non-tender, not distended, bowel sounds present      ICDI - no port site pain   Extremities: no edema, no calf pain, no skin discoloration  : scrotum with no lesions, bilateral descended and symmetric testicles, non-tender to palpation, urethra/meatus with no discharge, circumcised phallus with no lesions    Lab Results   Component Value Date    WBC 10.4 2024    WBC 10.4 2024    HGB 15.1 2024    HGB 15.1 2024    HCT 41.4 2024    HCT 41.4 2024    .0 2024    .0 2024    CREATSERUM 1.47 2024    BUN 17 2024     2024    K 3.9 2024     2024    CO2 26.0 2024     2024    CA 9.1 2024    MG 2.5 2024        PROSTATE-SPECIFIC AG, SERUM (ng/mL)   Date Value   2007 1.3   2006 1.1     PROSTATE SPECIFIC AG, SERUM (ng/mL)   Date Value   2015 1.0   2014 1.2   2013 1.3     PSA (ng/mL)   Date Value   2021 1.30   2020 1.74   2019 1.90         Intake/Output Summary (Last 24 hours) at 3/16/2024 1118  Last data filed at 3/16/2024 0836  Gross per 24 hour   Intake 3051 ml   Output 1675 ml   Net 1376 ml         Assessment and Plan:     POD   2    DC  home             Please give me a call on my cell if you have any questions of concerns.     Rolan Rivas MD   [facility]  Cell: [phone]  Office :[phone]    11:18 AM

## 2024-03-16 NOTE — PROGRESS NOTES
NURSING DISCHARGE NOTE    Discharged Home via Wheelchair.  Accompanied by Family member  Belongings Taken by patient/family.    Patient given discharge instructions. Instructed to follow-up with urology in 2 weeks. Patient reports prescription medications were given to him yesterday. IV removed and intact. All questions and concerns addressed at this time.

## 2024-03-16 NOTE — DISCHARGE SUMMARY
[facility]  Discharge Summary    Musa Chavez Patient Status:  Inpatient    1950 MRN [id number]   Location [facility] 3NW-A Attending No att. providers found   Hosp Day # 2 PCP Leon Durand MD         Admit date: 3/14/2024    Discharge date and time: 3/16/2024  1:14 PM     Admitting Physician: Gideon Anton MD     Discharge Physician: Desean    Admission Diagnoses: RIGHT RENAL MASS  Right renal mass    Discharge Diagnoses: Same    Admission Condition: good    Discharged Condition: good    Indication for Admission: Right renal mass    Hospital Course: Admitted on 3/14/2024 and underwent a robotic-assisted right radical nephrectomy. Uneventful post-op course. No post-op events.  Discharged in stable condition on 3/16.     Consults: none    Significant Diagnostic Studies: None    Treatments: surgery: Robotic right radical nephrectomy    Discharge Exam:  See progress note from day of discharge.     Disposition: Home or Self Care    Patient Instructions:      Discharge Medications        START taking these medications        Instructions Prescription details   docusate sodium 100 MG Caps  Commonly known as: COLACE      Take 100 mg by mouth 2 (two) times daily as needed for constipation.   Quantity: 30 capsule  Refills: 0     HYDROcodone-acetaminophen 5-325 MG Tabs  Commonly known as: Norco  Notes to patient: Take w/ food   Do not take w/ alcohol or while driving  Do not take w/ any additional tylenol products      Take 1 tablet by mouth every 6 (six) hours as needed.   Quantity: 10 tablet  Refills: 0            CHANGE how you take these medications        Instructions Prescription details   aspirin 325 MG Tabs  Start taking on: 2024  What changed:   additional instructions  These instructions start on 2024. If you are unsure what to do until then, ask your doctor or other care provider.      Take 1 tablet (325 mg total) by mouth daily. RESUME IN 7 DAYS   Refills: 0             CONTINUE taking these medications        Instructions Prescription details   amLODIPine 5 MG Tabs  Commonly known as: Norvasc      Take 1 tablet (5 mg total) by mouth daily.   Refills: 0     atorvastatin 10 MG Tabs  Commonly known as: Lipitor      Take 1 tablet (10 mg total) by mouth nightly.   Refills: 0     lamoTRIgine 25 MG Tabs  Commonly known as: LaMICtal      Take 3 tablets (75 mg total) by mouth every morning.   Refills: 0     losartan 100 MG Tabs  Commonly known as: Cozaar      Take 1 tablet (100 mg total) by mouth daily.   Quantity: 90 tablet  Refills: 1     metoprolol tartrate 25 MG Tabs  Commonly known as: Lopressor      Take 1 tablet (25 mg total) by mouth 2 (two) times daily.   Quantity: 180 tablet  Refills: 0               Where to Get Your Medications        These medications were sent to [address], [address] [facility] [phone], [phone]  [address] [place] [id number]      Phone: [phone]   docusate sodium 100 MG Caps  HYDROcodone-acetaminophen 5-325 MG Tabs       Activity: activity as tolerated  Diet: regular diet  Wound Care: keep wound clean and dry    Follow-up with Desean in 2 weeks.    Signed:  Gideon Anton MD  3/16/2024  1:32 PM

## 2024-03-16 NOTE — PLAN OF CARE
Alert, orient. Room air, no cough. Denies nausea. Belching, not passing gas. Hypoactive bowel sounds. Voiding freely. X4 laps sites, 1 midline with surgical glue. 2 PIV s/l. Pain managed well with 2 tab norco. Reviewed plan of care with pt.

## 2024-03-16 NOTE — PLAN OF CARE
Problem: Patient/Family Goals  Goal: Patient/Family Long Term Goal  Description: Patient's Long Term Goal: Discharge    Interventions:  - IV pain management  - Diet as tolerated  - See additional Care Plan goals for specific interventions  Outcome: Progressing  Goal: Patient/Family Short Term Goal  Description: Patient's Short Term Goal: Prepare for discharge    Interventions:   - Transition to PO pain meds  - Activity as tolerated  - See additional Care Plan goals for specific interventions  Outcome: Progressing     Problem: PAIN - ADULT  Goal: Verbalizes/displays adequate comfort level or patient's stated pain goal  Description: INTERVENTIONS:  - Encourage pt to monitor pain and request assistance  - Assess pain using appropriate pain scale  - Administer analgesics based on type and severity of pain and evaluate response  - Implement non-pharmacological measures as appropriate and evaluate response  - Consider cultural and social influences on pain and pain management  - Manage/alleviate anxiety  - Utilize distraction and/or relaxation techniques  - Monitor for opioid side effects  - Notify MD/LIP if interventions unsuccessful or patient reports new pain  - Anticipate increased pain with activity and pre-medicate as appropriate  Outcome: Progressing

## 2024-03-19 NOTE — PAYOR COMM NOTE
--------------  DISCHARGE REVIEW    Payor: [employer] [facility]  Subscriber #:  [id number]  Authorization Number: [id number]    Admit date: 3/14/24  Admit time:   5:57 AM  Discharge Date: 3/16/2024  1:14 PM     Admitting Physician: Gideon Anton MD  Attending Physician:  No att. providers found  Primary Care Physician: Leon Durand MD      Operative Report signed by Gideon Anton MD at 3/16/2024  1:42 PM    Author: Gideon Anton MD Service: Urology Author Type: Physician   Filed: 3/16/2024  1:42 PM Date of Service: 3/14/2024  5:48 PM Status: Signed   : Gideon Anton MD (Physician)     OPERATIVE REPORT     PATIENT NAME: Musa Chavez  YOB: 1950  DATE OF SERVICE: 3/14/2024     PREOPERATIVE DIAGNOSIS:  Right renal mass     POSTOPERATIVE DIAGNOSIS:  Same     PROCEDURES PERFORMED:  Robotic-assisted laparoscopic right radical nephrectomy  Intraoperative ultrasound     SURGEON:  Gideon Anton MD     ASSISTANTS:  DEAN Adrian     ANESTHESIA:  General Endotracheal     ANTIBIOTIC PROPHYLAXIS:  Ancef     ESTIMATED BLOOD LOSS:  50mL     SPECIMENS:  Right Kidney     DRAINS:  16F Rg catheter (10mL in balloon)     COMPLICATIONS:  No immediate complications     FINDINGS:  Adherent perinephric fat.  Endophytic tumor with poorly defined margins on intraoperative ultrasound and broad abutment of collecting system and parapelvic renal cyst.  Decision was made to proceed with a radical nephrectomy instead of a partial nephrectomy.      INDICATIONS FOR PROCEDURE:  Mr. Chavez is a 74 year old gentleman who was incidentally found to have a right renal mass. He was counseled about treatment options and he elected to proceed with surgical intervention. WE discussed the option of partial vs. Radical nephrectomy. His tumor was somewhat atypical appearing on imaging, with ill defined margins.  We discussed that if a partial nephrectomy could not be performed, then a radical nephrectomy would be done instead. He was in  agreement with the plan.      We discussed the robotic nephroureterectomy procedure in detail.  The procedure itself, as well as the expected convalescent period were reviewed.  Risks of this procedure include, but are not limited to bleeding (sometimes requiring transfusion), infection, damage to abdominal structures including liver, spleen, small bowel, colon, or the great vessels, urinary fistula, renal failure (rarely requiring dialysis), as well as risks of general anesthesia.      DESCRIPTION OF PROCEDURE:  After reviewing the indications for the procedure, informed consent was reviewed and signed by the patient.     The patient was brought to the operating room and placed in the supine position on the OR table.  SCD's were applied and all pressure points were carefully padded. Perioperative antibiotics and 5000U subcutaneous heparin were given.  At this point, general endotracheal anesthesia was successfully induced.  A pelletier catheter was placed and the bladder was drained fully.  The patient was then positioned in the left lateral decubitus position.  The bed was flexed slightly to open the space between the costal margin and the ASIS.  All pressure points were carefully padded.  An axillary roll was placed.  The patient was then secured to the bed using silk tape. They were then prepped and draped in the usual sterile fashion using chlorhexidine solution.      We then performed a surgical time out to confirm the correct patient, position, and laterality.  Everyone in the room was in agreement.       I began by inserting a Veress needle into the right upper quadrant.  A drop test was performed and there was no return of blood or enteric contents.  I then insufflated to 15mmHG and removed the Veress needle.  At this point an 8mm robotic trocar was placed into the right upper quadrant at the same position as the Veress.  A 0 degree camera was inserted and the peritoneum was inspected carefully. There were no  adhesions and no other pathology appreciated.  3 additional 8mm robotic ports were in the abdomen placed in a linear fashion to the right of the patient's midline.  These were done under direct vision and there was no evidence of injury to the intraperitoneal contents.  An additional 12mm assistant port was placed superior to the umbilicus.  A 5mm port was placed sub-xyphoid to serve as a liver retractor.      At this point the table was \"airplaned\" towards the patient's left side to facilitate gravity retraction of the bowel.  The robot was then docked and all instruments were placed under direct vision.      I began incising along the ascending colon along the line of Toldt to reflect the colon medially to expose the retroperitoneum.  I created a plane between Gerota's fascia and the colonic mesentery.       I then encountered the patient's duodenum and Kocherized it medially in a sharp fashion, with care to avoid electrocautery nearby.  I then identified the patient's inferior vena cava and dissected along the anterior surface of this both cephalad and caudad.  The gonadal vein was identified and reflected medially.  I then dissected along the lateral border of the IVC and identified the psoas muscle.  At this point, I retracted the ureter and lower pole of Gerota's fascia anterior and dissected along the psoas muscle until I encountered the patient's renal hilum.  The hilar vessels were dissected separately. There was a branched artery just posterior to the vein.  Both branches of the artery were isolated, so that bulldog clamps could later be placed if a partial nephrectomy was to be performed.      At this point, I defatted the kidney along its anterior surface.  The patient had very adherent perinephric fat, making this dissection tedious.  I cleared off the upper pole of the kidney and then used a drop in intraoperative ultrasound probe to assess the tumor. His tumor was endophytic, and there was no external  cues as to where the tumor was on the capsule of the kidney. The ultrasound probe was used to inspect the kidney and I could identify the tumor, however I felt the margins were very poorly defined, and there appeared to be broad abutment of the collecting system.  I elected at this point to convert to a radical nephrectomy, as I was not confident that I would be able to resect the tumor with negative surgical margins.      The renal hilum was already dissected.  I ligated the arterial branches using hemo lock clips, two on the stay side, and one on the kidney side.  The vein was ligated using a 60mm vascular staple load. The ureter was ligated using bipolar cautery.      I then created a plane between the adrenal gland and the upper pole of the kidney using electrocautery.  I then dissected the peritoneal attachments off the upper pole of the kidney, and then continued this laterally until the kidney was completely mobilized.      I then extended the 12mm assistant port to be wide enough to accommodate extraction of the kidney. The kidney was removed and was sent for pathologic evaluation as \"right kidney.\" Fascia was closed using running 0 PDS sutures. We reinserted the robotic camera, and ensured no bowel or omentum was snared in the fascial closure.      The abdomen was completely desufflated and all robotic ports were removed.  Skin was closed using subcuticular 4-0 monocryl sutures.  Derma-Bond was applied.       Our sponge, instrument, and needle counts were correct x2.  The patient was awoken from anesthesia and transferred to the recovery room in stable condition.      Please note, I was present for and performed the entirety of the procedure. DEAN Adrian served as my bedside assistant for the entirety of the procedure.     PLAN:  -Admit to observation for 1-2 nights.     Gideon Anton MD  [facility]              Discharge Summary Notes        Discharge Summary signed by Gideon Anton MD at  3/16/2024  1:34 PM       Author: Gideon Anton MD Specialty: UROLOGY Author Type: Physician    Filed: 3/16/2024  1:34 PM Date of Service: 3/16/2024  1:32 PM Status: Signed    : Gideon Anton MD (Physician)         [facility]  Discharge Summary    Musa Chavez Patient Status:  Inpatient    1950 MRN [id number]   Location [facility] 3NW-A Attending No att. providers found   Hosp Day # 2 PCP Leon Durand MD         Admit date: 3/14/2024    Discharge date and time: 3/16/2024  1:14 PM     Admitting Physician: Gideon Anton MD     Discharge Physician: Desean    Admission Diagnoses: RIGHT RENAL MASS  Right renal mass    Discharge Diagnoses: Same    Admission Condition: good    Discharged Condition: good    Indication for Admission: Right renal mass    Hospital Course: Admitted on 3/14/2024 and underwent a robotic-assisted right radical nephrectomy. Uneventful post-op course. No post-op events.  Discharged in stable condition on 3/16.     Consults: none    Significant Diagnostic Studies: None    Treatments: surgery: Robotic right radical nephrectomy    Discharge Exam:  See progress note from day of discharge.     Disposition: Home or Self Care    Patient Instructions:      Discharge Medications        START taking these medications        Instructions Prescription details   docusate sodium 100 MG Caps  Commonly known as: COLACE      Take 100 mg by mouth 2 (two) times daily as needed for constipation.   Quantity: 30 capsule  Refills: 0     HYDROcodone-acetaminophen 5-325 MG Tabs  Commonly known as: Norco  Notes to patient: Take w/ food   Do not take w/ alcohol or while driving  Do not take w/ any additional tylenol products      Take 1 tablet by mouth every 6 (six) hours as needed.   Quantity: 10 tablet  Refills: 0            CHANGE how you take these medications        Instructions Prescription details   aspirin 325 MG Tabs  Start taking on: 2024  What changed:   additional  instructions  These instructions start on March 22, 2024. If you are unsure what to do until then, ask your doctor or other care provider.      Take 1 tablet (325 mg total) by mouth daily. RESUME IN 7 DAYS   Refills: 0            CONTINUE taking these medications        Instructions Prescription details   amLODIPine 5 MG Tabs  Commonly known as: Norvasc      Take 1 tablet (5 mg total) by mouth daily.   Refills: 0     atorvastatin 10 MG Tabs  Commonly known as: Lipitor      Take 1 tablet (10 mg total) by mouth nightly.   Refills: 0     lamoTRIgine 25 MG Tabs  Commonly known as: LaMICtal      Take 3 tablets (75 mg total) by mouth every morning.   Refills: 0     losartan 100 MG Tabs  Commonly known as: Cozaar      Take 1 tablet (100 mg total) by mouth daily.   Quantity: 90 tablet  Refills: 1     metoprolol tartrate 25 MG Tabs  Commonly known as: Lopressor      Take 1 tablet (25 mg total) by mouth 2 (two) times daily.   Quantity: 180 tablet  Refills: 0               Where to Get Your Medications        These medications were sent to [place] [address] [phone], [phone]  [address] [place] [id number]      Phone: [phone]   docusate sodium 100 MG Caps  HYDROcodone-acetaminophen 5-325 MG Tabs       Activity: activity as tolerated  Diet: regular diet  Wound Care: keep wound clean and dry    Follow-up with Desean in 2 weeks.    Signed:  Gideon Anton MD  3/16/2024  1:32 PM      Electronically signed by Gideon Anton MD on 3/16/2024  1:34 PM         REVIEWER COMMENTS

## (undated) DEVICE — SUT COAT VCRL 3-0 27IN SH ABSRB UD 26MM 1/2

## (undated) DEVICE — SUTURE VCRL SZ 0 L54IN ABSRB UD POLYGLACTIN

## (undated) DEVICE — CLIP INT L POLYMER LOK LIG HEM O LOK

## (undated) DEVICE — 2, DISPOSABLE SUCTION/IRRIGATOR WITHOUT DISPOSABLE TIP: Brand: STRYKEFLOW

## (undated) DEVICE — CANNULA SEAL

## (undated) DEVICE — SUT PROL 4-0 36IN RB-1 NABSRB BLU 17MM 1/2 CI

## (undated) DEVICE — INSUFFLATION NEEDLE TO ESTABLISH PNEUMOPERITONEUM.: Brand: INSUFFLATION NEEDLE

## (undated) DEVICE — ADHESIVE SKIN TOP FOR WND CLSR DERMBND ADV

## (undated) DEVICE — TIP COVER ACCESSORY

## (undated) DEVICE — KIT CLEAN ENDOKIT 1.1OZ GOWNX2

## (undated) DEVICE — TISSUE RETRIEVAL SYSTEM: Brand: INZII RETRIEVAL SYSTEM

## (undated) DEVICE — ARTICULATING RELOAD WITH TRI-STAPLE TECHNOLOGY: Brand: ENDO GIA

## (undated) DEVICE — PROGRASP FORCEPS: Brand: ENDOWRIST

## (undated) DEVICE — CONMED SCOPE SAVER BITE BLOCK, 20X27 MM: Brand: SCOPE SAVER

## (undated) DEVICE — BLADELESS OBTURATOR: Brand: WECK VISTA

## (undated) DEVICE — SUT PDS II 0 36IN CT-1 ABSRB VLT L36MM 1/2

## (undated) DEVICE — #15 STERILE STAINLESS BLADE: Brand: STERILE STAINLESS BLADES

## (undated) DEVICE — YANKAUER,BULB TIP,W/O VENT,RIGID,STERILE: Brand: MEDLINE

## (undated) DEVICE — SUT V-LOC 90 3-0 9IN CV-23 ABSRB VLT 17MM 1/2

## (undated) DEVICE — AIRSEAL 12 MM ACCESS PORT AND PALM GRIP OBTURATOR WITH BLADELESS OPTICAL TIP, 120 MM LENGTH: Brand: AIRSEAL

## (undated) DEVICE — COLUMN DRAPE

## (undated) DEVICE — ABSORBABLE WOUND CLOSURE DEVICE: Brand: V-LOC 90

## (undated) DEVICE — FENESTRATED BIPOLAR FORCEPS: Brand: ENDOWRIST

## (undated) DEVICE — ROBOTIC GENERAL: Brand: MEDLINE INDUSTRIES, INC.

## (undated) DEVICE — TRAY CATH 16FR F INCL BARDX IC COMPLT CARE

## (undated) DEVICE — SUT MCRYL 4-0 18IN PS-2 ABSRB UD 19MM 3/8 CIR

## (undated) DEVICE — LARGE NEEDLE DRIVER: Brand: ENDOWRIST

## (undated) DEVICE — 60 ML SYRINGE REGULAR TIP: Brand: MONOJECT

## (undated) DEVICE — Device

## (undated) DEVICE — ARM DRAPE

## (undated) DEVICE — Device: Brand: DUAL NARE NASAL CANNULAE FEMALE LUER CON 7FT O2 TUBE

## (undated) DEVICE — SOLUTION IV 1000ML DIL ST H2O

## (undated) DEVICE — UNIVERSAL STAPLER: Brand: ENDO GIA ULTRA

## (undated) DEVICE — MEDI-VAC NON-CONDUCTIVE SUCTION TUBING: Brand: CARDINAL HEALTH

## (undated) DEVICE — LAPAROVUE VISIBILITY SYSTEM LAPAROSCOPIC SOLUTIONS: Brand: LAPAROVUE

## (undated) DEVICE — POUCH SPECIMEN WIRE 6X3 250ML

## (undated) DEVICE — COVER LT HNDL RIG FOR SUR CAM DISP

## (undated) DEVICE — MEDI-VAC NON-CONDUCTIVE SUCTION TUBING 6MM X 1.8M (6FT.) L: Brand: CARDINAL HEALTH

## (undated) DEVICE — GLOVE SUR 7.5 SENSICARE NEOPR PWD F

## (undated) DEVICE — MONOPOLAR CURVED SCISSORS: Brand: ENDOWRIST

## (undated) DEVICE — ABSORBABLE WOUND CLOSURE DEVICE: Brand: V-LOC 180

## (undated) NOTE — LETTER
[facility]  Administration of Anesthesia  RENAEMusa Kathy agree to be cared for by a physician anesthesiologist alone and/or with a nurse anesthetist, who is specially trained to monitor me and give me medicine to put me to sleep or keep me comfortable during my procedure    I understand that my anesthesiologist and/or anesthetist is not an employee or agent of [facility] or [employer]. He or she works for [facility]    As the patient asking for anesthesia services, I agree to:  Allow the anesthesiologist (anesthesia doctor) to give me medicine and do additional procedures as necessary. Some examples are: Starting or using an “IV” to give me medicine, fluids or blood during my procedure, and having a breathing tube placed to help me breathe when I’m asleep (intubation). In the event that my heart stops working properly, I understand that my anesthesiologist will make every effort to sustain my life, unless otherwise directed by [facility] Do Not Resuscitate documents.  Tell my anesthesia doctor before my procedure:  If I am pregnant.  The last time that I ate or drank.  iii. All of the medicines I take (including prescriptions, herbal supplements, and pills I can buy without a prescription (including street drugs/illegal medications). Failure to inform my anesthesiologist about these medicines may increase my risk of anesthetic complications.  iv.If I am allergic to anything or have had a reaction to anesthesia before.  I understand how the anesthesia medicine will help me (benefits).  I understand that with any type of anesthesia medicine there are risks:  The most common risks are: nausea, vomiting, sore throat, muscle soreness, damage to my eyes, mouth, or teeth (from breathing tube placement).  Rare risks include: remembering what happened during my procedure, allergic reactions to medications, injury to my airway, heart, lungs, vision, nerves, or  muscles and in extremely rare instances death.  My doctor has explained to me other choices available to me for my care (alternatives).  Pregnant Patients (“epidural”):  I understand that the risks of having an epidural (medicine given into my back to help control pain during labor), include itching, low blood pressure, difficulty urinating, headache or slowing of the baby’s heart. Very rare risks include infection, bleeding, seizure, irregular heart rhythms and nerve injury.  Regional Anesthesia (“spinal”, “epidural”, & “nerve blocks”):  I understand that rare but potential complications include headache, bleeding, infection, seizure, irregular heart rhythms, and nerve injury.    _____________________________________________________________________________  Patient (or Representative) Signature/Relationship to Patient  Date   Time    _____________________________________________________________________________   Name (if used)    Language/Organization   Time    _____________________________________________________________________________  Nurse Anesthetist Signature     Date   Time  _____________________________________________________________________________  Anesthesiologist Signature     Date   Time  I have discussed the procedure and information above with the patient (or patient’s representative) and answered their questions. The patient or their representative has agreed to have anesthesia services.    _____________________________________________________________________________  Witness        Date   Time  I have verified that the signature is that of the patient or patient’s representative, and that it was signed before the procedure  Patient Name: Musa DOMINGUEZ Kathy     : 1950                 Printed: 2024 at 11:58 AM    Medical Record #: [id number]                                            Page 1 of 1  ----------ANESTHESIA CONSENT----------

## (undated) NOTE — LETTER
OUTSIDE TESTING RESULT REQUEST     IMPORTANT: FOR YOUR IMMEDIATE ATTENTION  Please FAX all test results listed below to: [phone]       * * * * If testing is NOT complete, arrange with patient A.S.A.P. * * * *      Patient Name: Musa Chavez  Surgery Date: 3/14/2024  Medical Record: [id number]  CSN: [id number]  : 1950 - A: 74 y     Sex: male  Surgeon(s):  Gideon Anton MD  Procedure: XI ROBOT-ASSISTED LAPAROSCOPIC RIGHT PARTIAL NEPHRECTOMY, INTRAOPERATIVE ULTRASOUND, POSSIBLE RIGHT RADICAL NEPHRECTOMY  Anesthesia Type: General     Surgeon: Gideon Anton MD     The following Testing and Time Line are REQUIRED PER ANESTHESIA     URINE C&S WITHIN 1 WEEK PRIOR TO SURGERY DATE       Thank You,   Sent by:MARGIE HELM

## (undated) NOTE — LETTER
OUTSIDE TESTING RESULT REQUEST     IMPORTANT: FOR YOUR IMMEDIATE ATTENTION  Please FAX all test results listed below to: [phone]       * * * * If testing is NOT complete, arrange with patient A.S.A.P. * * * *      Patient Name: Musa Cahvez  Surgery Date: 3/14/2024  Medical Record: [id number]  CSN: [id number]  : 1950 - A: 74 y     Sex: male  Surgeon(s):  Gideon Anton MD  Procedure: XI ROBOT-ASSISTED LAPAROSCOPIC RIGHT PARTIAL NEPHRECTOMY, INTRAOPERATIVE ULTRASOUND, POSSIBLE RIGHT RADICAL NEPHRECTOMY  Anesthesia Type: General     Surgeon: Gideon Anton MD     The following Testing and Time Line are REQUIRED PER ANESTHESIA     EKG READ AND SIGNED WITHIN   90 days  Chest X-Ray within 6 months  CBC [with Differential & Platelets] within  90 days  CMP (requires 4 hour fast) within  60 days  PT/INR within  30 days  PTT within  30 days  Urine C&S within  30 days      Thank You,   Sent by:chanell HELM

## (undated) NOTE — LETTER
[facility]  [address], [place], [place]  Authorization for Surgical Operation and Procedure                                                                                           I hereby authorize Yaya Cueva MD, my physician and his/her assistants (if applicable), which may include medical students, residents, and/or fellows, to perform the following surgical operation/ procedure and administer such anesthesia as may be determined necessary by my physician: Operation/Procedure name (s) ENDOSCOPIC ULTRASOUND (UPPER) WITH FINE NEEDLE ASPIRATION on Musa R Kathy   2.   I recognize that during the surgical operation/procedure, unforeseen conditions may necessitate additional or different procedures than those listed above.  I, therefore, further authorize and request that the above-named surgeon, assistants, or designees perform such procedures as are, in their judgment, necessary and desirable.    3.   My surgeon/physician has discussed prior to my surgery the potential benefits, risks and side effects of this procedure; the likelihood of achieving goals; and potential problems that might occur during recuperation.  They also discussed reasonable alternatives to the procedure, including risks, benefits, and side effects related to the alternatives and risks related to not receiving this procedure.  I have had all my questions answered and I acknowledge that no guarantee has been made as to the result that may be obtained.    4.   Should the need arise during my operation/procedure, which includes change of level of care prior to discharge, I also consent to the administration of blood and/or blood products.  Further, I understand that despite careful testing and screening of blood or blood products by collecting agencies, I may still be subject to ill effects as a result of receiving a blood transfusion and/or blood products.  The following are some, but not all, of the potential  risks that can occur: fever and allergic reactions, hemolytic reactions, transmission of diseases such as Hepatitis, AIDS and Cytomegalovirus (CMV) and fluid overload.  In the event that I wish to have an autologous transfusion of my own blood, or a directed donor transfusion, I will discuss this with my physician.  Check only if Refusing Blood or Blood Products  I understand refusal of blood or blood products as deemed necessary by my physician may have serious consequences to my condition to include possible death. I hereby assume responsibility for my refusal and release the hospital, its personnel, and my physicians from any responsibility for the consequences of my refusal.    o  Refuse   5.   I authorize the use of any specimen, organs, tissues, body parts or foreign objects that may be removed from my body during the operation/procedure for diagnosis, research or teaching purposes and their subsequent disposal by hospital authorities.  I also authorize the release of specimen test results and/or written reports to my treating physician on the hospital medical staff or other referring or consulting physicians involved in my care, at the discretion of the Pathologist or my treating physician.    6.   I consent to the photographing or videotaping of the operations or procedures to be performed, including appropriate portions of my body for medical, scientific, or educational purposes, provided my identity is not revealed by the pictures or by descriptive texts accompanying them.  If the procedure has been photographed/videotaped, the surgeon will obtain the original picture, image, videotape or CD.  The hospital will not be responsible for storage, release or maintenance of the picture, image, tape or CD.    7.   I consent to the presence of a  or observers in the operating room as deemed necessary by my physician or their designees.    8.   I recognize that in the event my procedure results in  extended X-Ray/fluoroscopy time, I may develop a skin reaction.    9. If I have a Do Not Attempt Resuscitation (DNAR) order in place, that status will be suspended while in the operating room, procedural suite, and during the recovery period unless otherwise explicitly stated by me (or a person authorized to consent on my behalf). The surgeon or my attending physician will determine when the applicable recovery period ends for purposes of reinstating the DNAR order.  10. Patients having a sterilization procedure: I understand that if the procedure is successful the results will be permanent and it will therefore be impossible for me to inseminate, conceive, or bear children.  I also understand that the procedure is intended to result in sterility, although the result has not been guaranteed.   11. I acknowledge that my physician has explained sedation/analgesia administration to me including the risk and benefits I consent to the administration of sedation/analgesia as may be necessary or desirable in the judgment of my physician.    I CERTIFY THAT I HAVE READ AND FULLY UNDERSTAND THE ABOVE CONSENT TO OPERATION and/or OTHER PROCEDURE.     _________________________________________ _________________________________     ___________________________________  Signature of Patient     Signature of Responsible Person                   Printed Name of Responsible Person                              _________________________________________ ______________________________        ___________________________________  Signature of Witness         Date  Time         Relationship to Patient    STATEMENT OF PHYSICIAN My signature below affirms that prior to the time of the procedure; I have explained to the patient and/or his/her legal representative, the risks and benefits involved in the proposed treatment and any reasonable alternative to the proposed treatment. I have also explained the risks and benefits involved in refusal of  the proposed treatment and alternatives to the proposed treatment and have answered the patient's questions. If I have a significant financial interest in a co-management agreement or a significant financial interest in any product or implant, or other significant relationship used in this procedure/surgery, I have disclosed this and had a discussion with my patient.     _______________________________________________________________ _____________________________  (Signature of Physician)                                                                                         (Date)                                   (Time)  Patient Name: Musa Chavez    : 1950   Printed: 2024      Medical Record #: [id number]                                              Page 1 of 1